# Patient Record
Sex: FEMALE | Race: WHITE | NOT HISPANIC OR LATINO
[De-identification: names, ages, dates, MRNs, and addresses within clinical notes are randomized per-mention and may not be internally consistent; named-entity substitution may affect disease eponyms.]

---

## 2017-01-09 ENCOUNTER — RECORD ABSTRACTING (OUTPATIENT)
Age: 57
End: 2017-01-09

## 2017-01-09 DIAGNOSIS — F41.9 ANXIETY DISORDER, UNSPECIFIED: ICD-10-CM

## 2017-01-09 DIAGNOSIS — F17.200 NICOTINE DEPENDENCE, UNSPECIFIED, UNCOMPLICATED: ICD-10-CM

## 2017-01-09 DIAGNOSIS — F31.9 BIPOLAR DISORDER, UNSPECIFIED: ICD-10-CM

## 2017-01-09 DIAGNOSIS — Z87.01 PERSONAL HISTORY OF PNEUMONIA (RECURRENT): ICD-10-CM

## 2017-01-09 DIAGNOSIS — R56.9 UNSPECIFIED CONVULSIONS: ICD-10-CM

## 2017-01-09 PROBLEM — Z00.00 ENCOUNTER FOR PREVENTIVE HEALTH EXAMINATION: Status: ACTIVE | Noted: 2017-01-09

## 2017-01-09 RX ORDER — GABAPENTIN 300 MG
300 TABLET ORAL
Refills: 0 | Status: ACTIVE | COMMUNITY

## 2017-01-09 RX ORDER — CLONAZEPAM 2 MG/1
2 TABLET ORAL
Refills: 0 | Status: ACTIVE | COMMUNITY

## 2017-01-09 RX ORDER — DIVALPROEX SODIUM 500 MG/1
500 TABLET, DELAYED RELEASE ORAL
Refills: 0 | Status: ACTIVE | COMMUNITY

## 2017-02-24 ENCOUNTER — INPATIENT (INPATIENT)
Facility: HOSPITAL | Age: 57
LOS: 2 days | Discharge: HOME | End: 2017-02-27
Attending: HOSPITALIST

## 2017-03-18 ENCOUNTER — INPATIENT (INPATIENT)
Facility: HOSPITAL | Age: 57
LOS: 8 days | Discharge: HOME | End: 2017-03-27
Attending: INTERNAL MEDICINE

## 2017-06-05 ENCOUNTER — EMERGENCY (EMERGENCY)
Facility: HOSPITAL | Age: 57
LOS: 0 days | Discharge: HOME | End: 2017-06-05

## 2017-06-05 DIAGNOSIS — F31.70 BIPOLAR DISORDER, CURRENTLY IN REMISSION, MOST RECENT EPISODE UNSPECIFIED: ICD-10-CM

## 2017-06-05 DIAGNOSIS — K52.9 NONINFECTIVE GASTROENTERITIS AND COLITIS, UNSPECIFIED: ICD-10-CM

## 2017-06-05 DIAGNOSIS — F13.20 SEDATIVE, HYPNOTIC OR ANXIOLYTIC DEPENDENCE, UNCOMPLICATED: ICD-10-CM

## 2017-06-05 DIAGNOSIS — F39 UNSPECIFIED MOOD [AFFECTIVE] DISORDER: ICD-10-CM

## 2017-06-05 DIAGNOSIS — F33.2 MAJOR DEPRESSIVE DISORDER, RECURRENT SEVERE WITHOUT PSYCHOTIC FEATURES: ICD-10-CM

## 2017-06-05 DIAGNOSIS — M19.90 UNSPECIFIED OSTEOARTHRITIS, UNSPECIFIED SITE: ICD-10-CM

## 2017-06-05 DIAGNOSIS — S06.9X9A UNSPECIFIED INTRACRANIAL INJURY WITH LOSS OF CONSCIOUSNESS OF UNSPECIFIED DURATION, INITIAL ENCOUNTER: ICD-10-CM

## 2017-06-05 DIAGNOSIS — R00.1 BRADYCARDIA, UNSPECIFIED: ICD-10-CM

## 2017-06-05 DIAGNOSIS — E78.5 HYPERLIPIDEMIA, UNSPECIFIED: ICD-10-CM

## 2017-06-05 DIAGNOSIS — R56.9 UNSPECIFIED CONVULSIONS: ICD-10-CM

## 2017-06-05 DIAGNOSIS — K57.30 DIVERTICULOSIS OF LARGE INTESTINE WITHOUT PERFORATION OR ABSCESS WITHOUT BLEEDING: ICD-10-CM

## 2017-06-05 DIAGNOSIS — J44.9 CHRONIC OBSTRUCTIVE PULMONARY DISEASE, UNSPECIFIED: ICD-10-CM

## 2017-06-05 DIAGNOSIS — F93.0 SEPARATION ANXIETY DISORDER OF CHILDHOOD: ICD-10-CM

## 2017-06-05 DIAGNOSIS — F17.200 NICOTINE DEPENDENCE, UNSPECIFIED, UNCOMPLICATED: ICD-10-CM

## 2017-06-05 DIAGNOSIS — K80.20 CALCULUS OF GALLBLADDER WITHOUT CHOLECYSTITIS WITHOUT OBSTRUCTION: ICD-10-CM

## 2017-06-05 DIAGNOSIS — I38 ENDOCARDITIS, VALVE UNSPECIFIED: ICD-10-CM

## 2017-06-05 DIAGNOSIS — G91.9 HYDROCEPHALUS, UNSPECIFIED: ICD-10-CM

## 2017-06-05 DIAGNOSIS — G83.9 PARALYTIC SYNDROME, UNSPECIFIED: ICD-10-CM

## 2017-06-05 DIAGNOSIS — F41.9 ANXIETY DISORDER, UNSPECIFIED: ICD-10-CM

## 2017-06-05 DIAGNOSIS — R32 UNSPECIFIED URINARY INCONTINENCE: ICD-10-CM

## 2017-06-05 DIAGNOSIS — F11.20 OPIOID DEPENDENCE, UNCOMPLICATED: ICD-10-CM

## 2017-06-05 DIAGNOSIS — B18.2 CHRONIC VIRAL HEPATITIS C: ICD-10-CM

## 2017-06-05 DIAGNOSIS — I10 ESSENTIAL (PRIMARY) HYPERTENSION: ICD-10-CM

## 2017-06-05 DIAGNOSIS — J69.0 PNEUMONITIS DUE TO INHALATION OF FOOD AND VOMIT: ICD-10-CM

## 2017-06-05 DIAGNOSIS — S09.90XA UNSPECIFIED INJURY OF HEAD, INITIAL ENCOUNTER: ICD-10-CM

## 2017-06-05 DIAGNOSIS — J96.00 ACUTE RESPIRATORY FAILURE, UNSPECIFIED WHETHER WITH HYPOXIA OR HYPERCAPNIA: ICD-10-CM

## 2017-06-28 DIAGNOSIS — J45.909 UNSPECIFIED ASTHMA, UNCOMPLICATED: ICD-10-CM

## 2017-06-28 DIAGNOSIS — R52 PAIN, UNSPECIFIED: ICD-10-CM

## 2017-06-28 DIAGNOSIS — M54.5 LOW BACK PAIN: ICD-10-CM

## 2017-06-28 DIAGNOSIS — G40.909 EPILEPSY, UNSPECIFIED, NOT INTRACTABLE, WITHOUT STATUS EPILEPTICUS: ICD-10-CM
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
## 2017-06-28 DIAGNOSIS — J44.1 CHRONIC OBSTRUCTIVE PULMONARY DISEASE WITH (ACUTE) EXACERBATION: ICD-10-CM

## 2017-06-28 DIAGNOSIS — T42.4X1A POISONING BY BENZODIAZEPINES, ACCIDENTAL (UNINTENTIONAL), INITIAL ENCOUNTER: ICD-10-CM

## 2017-06-28 DIAGNOSIS — R53.1 WEAKNESS: ICD-10-CM

## 2017-06-28 DIAGNOSIS — Z79.899 OTHER LONG TERM (CURRENT) DRUG THERAPY: ICD-10-CM

## 2017-06-28 DIAGNOSIS — F31.9 BIPOLAR DISORDER, UNSPECIFIED: ICD-10-CM

## 2017-06-28 DIAGNOSIS — M54.2 CERVICALGIA: ICD-10-CM

## 2017-06-28 DIAGNOSIS — J98.11 ATELECTASIS: ICD-10-CM

## 2017-06-28 DIAGNOSIS — F13.229 SEDATIVE, HYPNOTIC OR ANXIOLYTIC DEPENDENCE WITH INTOXICATION, UNSPECIFIED: ICD-10-CM
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
## 2017-06-28 DIAGNOSIS — M54.6 PAIN IN THORACIC SPINE: ICD-10-CM

## 2017-06-28 DIAGNOSIS — J44.0 CHRONIC OBSTRUCTIVE PULMONARY DISEASE WITH ACUTE LOWER RESPIRATORY INFECTION: ICD-10-CM

## 2017-06-28 DIAGNOSIS — J20.9 ACUTE BRONCHITIS, UNSPECIFIED: ICD-10-CM
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
## 2017-06-28 DIAGNOSIS — S50.02XA CONTUSION OF LEFT ELBOW, INITIAL ENCOUNTER: ICD-10-CM

## 2017-06-28 DIAGNOSIS — J96.11 CHRONIC RESPIRATORY FAILURE WITH HYPOXIA: ICD-10-CM

## 2017-06-28 DIAGNOSIS — F17.210 NICOTINE DEPENDENCE, CIGARETTES, UNCOMPLICATED: ICD-10-CM

## 2017-06-28 DIAGNOSIS — F32.9 MAJOR DEPRESSIVE DISORDER, SINGLE EPISODE, UNSPECIFIED: ICD-10-CM

## 2017-06-28 DIAGNOSIS — R26.89 OTHER ABNORMALITIES OF GAIT AND MOBILITY: ICD-10-CM

## 2017-06-28 DIAGNOSIS — F41.9 ANXIETY DISORDER, UNSPECIFIED: ICD-10-CM

## 2017-06-28 DIAGNOSIS — Z91.19 PATIENT'S NONCOMPLIANCE WITH OTHER MEDICAL TREATMENT AND REGIMEN: ICD-10-CM

## 2017-06-28 DIAGNOSIS — M79.622 PAIN IN LEFT UPPER ARM: ICD-10-CM

## 2017-06-28 DIAGNOSIS — S05.11XA CONTUSION OF EYEBALL AND ORBITAL TISSUES, RIGHT EYE, INITIAL ENCOUNTER: ICD-10-CM

## 2017-06-28 DIAGNOSIS — Z91.5 PERSONAL HISTORY OF SELF-HARM: ICD-10-CM

## 2017-06-28 DIAGNOSIS — Z91.81 HISTORY OF FALLING: ICD-10-CM

## 2017-07-03 ENCOUNTER — OUTPATIENT (OUTPATIENT)
Dept: OUTPATIENT SERVICES | Facility: HOSPITAL | Age: 57
LOS: 1 days | Discharge: HOME | End: 2017-07-03

## 2017-07-03 DIAGNOSIS — E78.5 HYPERLIPIDEMIA, UNSPECIFIED: ICD-10-CM

## 2017-07-03 DIAGNOSIS — F31.70 BIPOLAR DISORDER, CURRENTLY IN REMISSION, MOST RECENT EPISODE UNSPECIFIED: ICD-10-CM

## 2017-07-03 DIAGNOSIS — R00.1 BRADYCARDIA, UNSPECIFIED: ICD-10-CM

## 2017-07-03 DIAGNOSIS — K52.9 NONINFECTIVE GASTROENTERITIS AND COLITIS, UNSPECIFIED: ICD-10-CM

## 2017-07-03 DIAGNOSIS — S09.90XA UNSPECIFIED INJURY OF HEAD, INITIAL ENCOUNTER: ICD-10-CM

## 2017-07-03 DIAGNOSIS — K57.30 DIVERTICULOSIS OF LARGE INTESTINE WITHOUT PERFORATION OR ABSCESS WITHOUT BLEEDING: ICD-10-CM

## 2017-07-03 DIAGNOSIS — S06.9X9A UNSPECIFIED INTRACRANIAL INJURY WITH LOSS OF CONSCIOUSNESS OF UNSPECIFIED DURATION, INITIAL ENCOUNTER: ICD-10-CM

## 2017-07-03 DIAGNOSIS — J44.9 CHRONIC OBSTRUCTIVE PULMONARY DISEASE, UNSPECIFIED: ICD-10-CM

## 2017-07-03 DIAGNOSIS — K80.20 CALCULUS OF GALLBLADDER WITHOUT CHOLECYSTITIS WITHOUT OBSTRUCTION: ICD-10-CM

## 2017-07-03 DIAGNOSIS — F39 UNSPECIFIED MOOD [AFFECTIVE] DISORDER: ICD-10-CM

## 2017-07-03 DIAGNOSIS — J69.0 PNEUMONITIS DUE TO INHALATION OF FOOD AND VOMIT: ICD-10-CM

## 2017-07-03 DIAGNOSIS — B18.2 CHRONIC VIRAL HEPATITIS C: ICD-10-CM

## 2017-07-03 DIAGNOSIS — G91.9 HYDROCEPHALUS, UNSPECIFIED: ICD-10-CM

## 2017-07-03 DIAGNOSIS — I10 ESSENTIAL (PRIMARY) HYPERTENSION: ICD-10-CM

## 2017-07-03 DIAGNOSIS — G83.9 PARALYTIC SYNDROME, UNSPECIFIED: ICD-10-CM

## 2017-07-03 DIAGNOSIS — I38 ENDOCARDITIS, VALVE UNSPECIFIED: ICD-10-CM

## 2017-07-03 DIAGNOSIS — J96.00 ACUTE RESPIRATORY FAILURE, UNSPECIFIED WHETHER WITH HYPOXIA OR HYPERCAPNIA: ICD-10-CM

## 2017-07-03 DIAGNOSIS — F33.2 MAJOR DEPRESSIVE DISORDER, RECURRENT SEVERE WITHOUT PSYCHOTIC FEATURES: ICD-10-CM

## 2017-07-03 DIAGNOSIS — F11.20 OPIOID DEPENDENCE, UNCOMPLICATED: ICD-10-CM

## 2017-07-03 DIAGNOSIS — F41.9 ANXIETY DISORDER, UNSPECIFIED: ICD-10-CM

## 2017-07-03 DIAGNOSIS — M19.90 UNSPECIFIED OSTEOARTHRITIS, UNSPECIFIED SITE: ICD-10-CM

## 2017-07-03 DIAGNOSIS — F13.20 SEDATIVE, HYPNOTIC OR ANXIOLYTIC DEPENDENCE, UNCOMPLICATED: ICD-10-CM

## 2017-07-03 DIAGNOSIS — R56.9 UNSPECIFIED CONVULSIONS: ICD-10-CM

## 2017-07-03 DIAGNOSIS — R32 UNSPECIFIED URINARY INCONTINENCE: ICD-10-CM

## 2017-07-03 DIAGNOSIS — F17.200 NICOTINE DEPENDENCE, UNSPECIFIED, UNCOMPLICATED: ICD-10-CM

## 2017-07-03 DIAGNOSIS — F93.0 SEPARATION ANXIETY DISORDER OF CHILDHOOD: ICD-10-CM

## 2018-02-02 DIAGNOSIS — F32.9 MAJOR DEPRESSIVE DISORDER, SINGLE EPISODE, UNSPECIFIED: ICD-10-CM

## 2018-02-02 DIAGNOSIS — G89.4 CHRONIC PAIN SYNDROME: ICD-10-CM

## 2018-02-02 DIAGNOSIS — R29.6 REPEATED FALLS: ICD-10-CM

## 2018-02-02 DIAGNOSIS — F13.20 SEDATIVE, HYPNOTIC OR ANXIOLYTIC DEPENDENCE, UNCOMPLICATED: ICD-10-CM

## 2018-02-02 DIAGNOSIS — F17.210 NICOTINE DEPENDENCE, CIGARETTES, UNCOMPLICATED: ICD-10-CM

## 2018-02-02 DIAGNOSIS — F41.9 ANXIETY DISORDER, UNSPECIFIED: ICD-10-CM

## 2018-02-02 DIAGNOSIS — G40.909 EPILEPSY, UNSPECIFIED, NOT INTRACTABLE, WITHOUT STATUS EPILEPTICUS: ICD-10-CM

## 2018-02-02 DIAGNOSIS — F11.20 OPIOID DEPENDENCE, UNCOMPLICATED: ICD-10-CM

## 2019-03-05 ENCOUNTER — INPATIENT (INPATIENT)
Facility: HOSPITAL | Age: 59
LOS: 3 days | Discharge: HOME | End: 2019-03-09
Attending: INTERNAL MEDICINE | Admitting: INTERNAL MEDICINE
Payer: MEDICARE

## 2019-03-05 VITALS
OXYGEN SATURATION: 90 % | HEART RATE: 111 BPM | RESPIRATION RATE: 20 BRPM | SYSTOLIC BLOOD PRESSURE: 144 MMHG | DIASTOLIC BLOOD PRESSURE: 70 MMHG | TEMPERATURE: 99 F

## 2019-03-05 RX ORDER — SODIUM CHLORIDE 9 MG/ML
2000 INJECTION INTRAMUSCULAR; INTRAVENOUS; SUBCUTANEOUS ONCE
Qty: 0 | Refills: 0 | Status: COMPLETED | OUTPATIENT
Start: 2019-03-05 | End: 2019-03-05

## 2019-03-05 NOTE — ED ADULT TRIAGE NOTE - CHIEF COMPLAINT QUOTE
[place] [facility] [place] has hx of pneumonia w/ intubation.

## 2019-03-06 LAB
ALBUMIN SERPL ELPH-MCNC: 4 G/DL — SIGNIFICANT CHANGE UP (ref 3.5–5.2)
ALP SERPL-CCNC: 149 U/L — HIGH (ref 30–115)
ALT FLD-CCNC: 42 U/L — HIGH (ref 0–41)
ANION GAP SERPL CALC-SCNC: 20 MMOL/L — HIGH (ref 7–14)
APTT BLD: 30.4 SEC — SIGNIFICANT CHANGE UP (ref 27–39.2)
AST SERPL-CCNC: 55 U/L — HIGH (ref 0–41)
BASE EXCESS BLDV CALC-SCNC: 0.3 MMOL/L — SIGNIFICANT CHANGE UP (ref -2–2)
BASOPHILS # BLD AUTO: 0.01 K/UL — SIGNIFICANT CHANGE UP (ref 0–0.2)
BASOPHILS NFR BLD AUTO: 0.1 % — SIGNIFICANT CHANGE UP (ref 0–1)
BILIRUB SERPL-MCNC: 0.7 MG/DL — SIGNIFICANT CHANGE UP (ref 0.2–1.2)
BUN SERPL-MCNC: 12 MG/DL — SIGNIFICANT CHANGE UP (ref 10–20)
CA-I SERPL-SCNC: 1.29 MMOL/L — SIGNIFICANT CHANGE UP (ref 1.12–1.3)
CALCIUM SERPL-MCNC: 9.5 MG/DL — SIGNIFICANT CHANGE UP (ref 8.5–10.1)
CHLORIDE SERPL-SCNC: 98 MMOL/L — SIGNIFICANT CHANGE UP (ref 98–110)
CO2 SERPL-SCNC: 20 MMOL/L — SIGNIFICANT CHANGE UP (ref 17–32)
CREAT SERPL-MCNC: 0.8 MG/DL — SIGNIFICANT CHANGE UP (ref 0.7–1.5)
EOSINOPHIL # BLD AUTO: 0.01 K/UL — SIGNIFICANT CHANGE UP (ref 0–0.7)
EOSINOPHIL NFR BLD AUTO: 0.1 % — SIGNIFICANT CHANGE UP (ref 0–8)
GAS PNL BLDV: 140 MMOL/L — SIGNIFICANT CHANGE UP (ref 136–145)
GAS PNL BLDV: SIGNIFICANT CHANGE UP
GLUCOSE SERPL-MCNC: 110 MG/DL — HIGH (ref 70–99)
HCO3 BLDV-SCNC: 29 MMOL/L — SIGNIFICANT CHANGE UP (ref 22–29)
HCT VFR BLD CALC: 41 % — SIGNIFICANT CHANGE UP (ref 37–47)
HCT VFR BLDA CALC: 44.4 % — HIGH (ref 34–44)
HGB BLD CALC-MCNC: 14.5 G/DL — SIGNIFICANT CHANGE UP (ref 14–18)
HGB BLD-MCNC: 14.1 G/DL — SIGNIFICANT CHANGE UP (ref 12–16)
IMM GRANULOCYTES NFR BLD AUTO: 0.4 % — HIGH (ref 0.1–0.3)
INR BLD: 1.03 RATIO — SIGNIFICANT CHANGE UP (ref 0.65–1.3)
LACTATE BLDV-MCNC: 0.7 MMOL/L — SIGNIFICANT CHANGE UP (ref 0.5–1.6)
LYMPHOCYTES # BLD AUTO: 1.92 K/UL — SIGNIFICANT CHANGE UP (ref 1.2–3.4)
LYMPHOCYTES # BLD AUTO: 21.6 % — SIGNIFICANT CHANGE UP (ref 20.5–51.1)
MCHC RBC-ENTMCNC: 32.5 PG — HIGH (ref 27–31)
MCHC RBC-ENTMCNC: 34.4 G/DL — SIGNIFICANT CHANGE UP (ref 32–37)
MCV RBC AUTO: 94.5 FL — SIGNIFICANT CHANGE UP (ref 81–99)
MONOCYTES # BLD AUTO: 0.97 K/UL — HIGH (ref 0.1–0.6)
MONOCYTES NFR BLD AUTO: 10.9 % — HIGH (ref 1.7–9.3)
NEUTROPHILS # BLD AUTO: 5.94 K/UL — SIGNIFICANT CHANGE UP (ref 1.4–6.5)
NEUTROPHILS NFR BLD AUTO: 66.9 % — SIGNIFICANT CHANGE UP (ref 42.2–75.2)
NRBC # BLD: 0 /100 WBCS — SIGNIFICANT CHANGE UP (ref 0–0)
PCO2 BLDV: 62 MMHG — HIGH (ref 41–51)
PH BLDV: 7.28 — SIGNIFICANT CHANGE UP (ref 7.26–7.43)
PLATELET # BLD AUTO: 144 K/UL — SIGNIFICANT CHANGE UP (ref 130–400)
PO2 BLDV: 23 MMHG — SIGNIFICANT CHANGE UP (ref 20–40)
POTASSIUM BLDV-SCNC: 3.5 MMOL/L — SIGNIFICANT CHANGE UP (ref 3.3–5.6)
POTASSIUM SERPL-MCNC: 3.8 MMOL/L — SIGNIFICANT CHANGE UP (ref 3.5–5)
POTASSIUM SERPL-SCNC: 3.8 MMOL/L — SIGNIFICANT CHANGE UP (ref 3.5–5)
PROT SERPL-MCNC: 6.9 G/DL — SIGNIFICANT CHANGE UP (ref 6–8)
PROTHROM AB SERPL-ACNC: 11.8 SEC — SIGNIFICANT CHANGE UP (ref 9.95–12.87)
RBC # BLD: 4.34 M/UL — SIGNIFICANT CHANGE UP (ref 4.2–5.4)
RBC # FLD: 13.8 % — SIGNIFICANT CHANGE UP (ref 11.5–14.5)
SAO2 % BLDV: 44 % — SIGNIFICANT CHANGE UP
SODIUM SERPL-SCNC: 138 MMOL/L — SIGNIFICANT CHANGE UP (ref 135–146)
WBC # BLD: 8.89 K/UL — SIGNIFICANT CHANGE UP (ref 4.8–10.8)
WBC # FLD AUTO: 8.89 K/UL — SIGNIFICANT CHANGE UP (ref 4.8–10.8)

## 2019-03-06 RX ORDER — ESCITALOPRAM OXALATE 10 MG/1
10 TABLET, FILM COATED ORAL DAILY
Qty: 0 | Refills: 0 | Status: DISCONTINUED | OUTPATIENT
Start: 2019-03-06 | End: 2019-03-09

## 2019-03-06 RX ORDER — CHLORHEXIDINE GLUCONATE 213 G/1000ML
1 SOLUTION TOPICAL
Qty: 0 | Refills: 0 | Status: DISCONTINUED | OUTPATIENT
Start: 2019-03-06 | End: 2019-03-09

## 2019-03-06 RX ORDER — ERGOCALCIFEROL 1.25 MG/1
50000 CAPSULE ORAL
Qty: 0 | Refills: 0 | Status: DISCONTINUED | OUTPATIENT
Start: 2019-03-06 | End: 2019-03-09

## 2019-03-06 RX ORDER — IPRATROPIUM/ALBUTEROL SULFATE 18-103MCG
3 AEROSOL WITH ADAPTER (GRAM) INHALATION EVERY 6 HOURS
Qty: 0 | Refills: 0 | Status: DISCONTINUED | OUTPATIENT
Start: 2019-03-06 | End: 2019-03-09

## 2019-03-06 RX ORDER — CLONAZEPAM 1 MG
2 TABLET ORAL
Qty: 0 | Refills: 0 | Status: DISCONTINUED | OUTPATIENT
Start: 2019-03-06 | End: 2019-03-09

## 2019-03-06 RX ORDER — IPRATROPIUM/ALBUTEROL SULFATE 18-103MCG
3 AEROSOL WITH ADAPTER (GRAM) INHALATION ONCE
Qty: 0 | Refills: 0 | Status: COMPLETED | OUTPATIENT
Start: 2019-03-06 | End: 2019-03-06

## 2019-03-06 RX ORDER — BUDESONIDE AND FORMOTEROL FUMARATE DIHYDRATE 160; 4.5 UG/1; UG/1
2 AEROSOL RESPIRATORY (INHALATION)
Qty: 0 | Refills: 0 | Status: DISCONTINUED | OUTPATIENT
Start: 2019-03-06 | End: 2019-03-09

## 2019-03-06 RX ORDER — AZITHROMYCIN 500 MG/1
500 TABLET, FILM COATED ORAL DAILY
Qty: 0 | Refills: 0 | Status: DISCONTINUED | OUTPATIENT
Start: 2019-03-07 | End: 2019-03-09

## 2019-03-06 RX ORDER — ZOLPIDEM TARTRATE 10 MG/1
5 TABLET ORAL ONCE
Qty: 0 | Refills: 0 | Status: DISCONTINUED | OUTPATIENT
Start: 2019-03-06 | End: 2019-03-06

## 2019-03-06 RX ORDER — GABAPENTIN 400 MG/1
800 CAPSULE ORAL THREE TIMES A DAY
Qty: 0 | Refills: 0 | Status: DISCONTINUED | OUTPATIENT
Start: 2019-03-06 | End: 2019-03-09

## 2019-03-06 RX ORDER — ENOXAPARIN SODIUM 100 MG/ML
40 INJECTION SUBCUTANEOUS DAILY
Qty: 0 | Refills: 0 | Status: DISCONTINUED | OUTPATIENT
Start: 2019-03-06 | End: 2019-03-09

## 2019-03-06 RX ADMIN — SODIUM CHLORIDE 1000 MILLILITER(S): 9 INJECTION INTRAMUSCULAR; INTRAVENOUS; SUBCUTANEOUS at 01:13

## 2019-03-06 RX ADMIN — Medication 3 MILLILITER(S): at 02:20

## 2019-03-06 RX ADMIN — Medication 60 MILLIGRAM(S): at 11:47

## 2019-03-06 RX ADMIN — Medication 2 MILLIGRAM(S): at 06:04

## 2019-03-06 RX ADMIN — GABAPENTIN 800 MILLIGRAM(S): 400 CAPSULE ORAL at 05:31

## 2019-03-06 RX ADMIN — Medication 3 MILLILITER(S): at 21:58

## 2019-03-06 RX ADMIN — ERGOCALCIFEROL 50000 UNIT(S): 1.25 CAPSULE ORAL at 11:42

## 2019-03-06 RX ADMIN — BUDESONIDE AND FORMOTEROL FUMARATE DIHYDRATE 2 PUFF(S): 160; 4.5 AEROSOL RESPIRATORY (INHALATION) at 21:59

## 2019-03-06 RX ADMIN — GABAPENTIN 800 MILLIGRAM(S): 400 CAPSULE ORAL at 14:39

## 2019-03-06 RX ADMIN — Medication 3 MILLILITER(S): at 01:48

## 2019-03-06 RX ADMIN — Medication 3 MILLILITER(S): at 11:47

## 2019-03-06 RX ADMIN — Medication 2 MILLIGRAM(S): at 18:04

## 2019-03-06 RX ADMIN — Medication 125 MILLIGRAM(S): at 01:00

## 2019-03-06 RX ADMIN — ZOLPIDEM TARTRATE 5 MILLIGRAM(S): 10 TABLET ORAL at 23:17

## 2019-03-06 RX ADMIN — ESCITALOPRAM OXALATE 10 MILLIGRAM(S): 10 TABLET, FILM COATED ORAL at 11:41

## 2019-03-06 RX ADMIN — GABAPENTIN 800 MILLIGRAM(S): 400 CAPSULE ORAL at 21:58

## 2019-03-06 RX ADMIN — Medication 3 MILLILITER(S): at 02:00

## 2019-03-06 NOTE — ED PROVIDER NOTE - OBJECTIVE STATEMENT
59 y/o F with PMH hepC, bipolar d/o, PNA requiring intubation in past (most recent 6 years ago), COPD not on home O2, but told that she needs it, presents with cough/SOB/Congestion x wks, worse today. in triage desaturated to low 80s, but improved with 4LPM O2 via NC. Denies CP, palpitations, back pain, abdominal pain, n/v/d, fevers, sweats, chills, HA, sore throat/difficulty swallowing, trauma, fall, recent travel, sick contacts, leg pain/swelling, urinary symptoms, rash. Denies hemoptysis, recent surgery/immobilization, hx cancers, hx PE/DVT,  hormone use.

## 2019-03-06 NOTE — H&P ADULT - NSHPPHYSICALEXAM_GEN_ALL_CORE
GENERAL: NAD, well-groomed, well-developed  HEAD:  NCAT  EYES: EOMI, PERRL, conjunctiva clear  ENMT: No tonsillar erythema, exudates, or enlargement; Moist mucous membranes, Good dentition, No lesions  NECK: Supple, No JVD  NERVOUS SYSTEM: AAOX3  CHEST/LUNG: decreased bs b/l  HEART: +s1s2 RRR no m/g/r  ABDOMEN: soft, NT/ND (+) bs, no HSM  EXTREMITIES:  2+ Peripheral Pulses, No c/c/e  LYMPH: No lymphadenopathy noted  SKIN: No rashes or lesions GENERAL: NAD, well-groomed, well-developed  HEAD:  NCAT  EYES: EOMI, PERRL, conjunctiva clear  ENMT: No tonsillar erythema, exudates, or enlargement; Moist mucous membranes, Good dentition, No lesions  NECK: Supple, No JVD  NERVOUS SYSTEM: AAOX3  CHEST/LUNG: decreased bs b/l, expiratory wheezing, crackles  HEART: +s1s2 RRR no m/g/r  ABDOMEN: soft, NT/ND (+) bs, no HSM  EXTREMITIES:  2+ Peripheral Pulses, No c/c/e  LYMPH: No lymphadenopathy noted  SKIN: No rashes or lesions

## 2019-03-06 NOTE — H&P ADULT - HISTORY OF PRESENT ILLNESS
58 year lady with PMHx COPD not currently on O2, Bipolar disorder, Hep C presenting with cough, SOB, congestion x a few weeks. Admits to fever, chills, bloody tinged diarrhea for the past few days. She states that she has been not herself lately as she has been getting lost in her neighborhood and forgetting. She knocked on her landlords door at 5 AM thinking it was 5pm. She admits to letting herself go as she is very afraid of hospitals. Denied HA, blurry vision, cp, palpitations, syncope, dizziness, LE swelling, wheezing, hemoptysis, abdominal pain, n/v/c, numbness, tingling, weakness, rash, urinary incontinance, dysuria, hematuria, melena. Of note, she states that 6 years ago she was on home oxygen after she was on a ventilator but thereafter did not need it anymore.    In ED, bp  144/70    RR  20   90% on 4LNC  T98.8F. She desaturated to low 80s but improved with 4L NC. CXR showed RLL PNA.

## 2019-03-06 NOTE — ED PROVIDER NOTE - PHYSICAL EXAMINATION
PHYSICAL EXAM:    GENERAL: Alert, appears stated age, well appearing, non-toxic. speaking in complete sentences.   SKIN: Warm, pink and dry. MMM.   EYE: Normal lids/conjunctiva  ENT: Normal hearing, patent oropharynx without erythema or exudate  NECK: +supple. No meningismus, or JVD  Pulm: Bilateral BS, normal resp effort, no stridor, or retractions. +minimal wheeze. +RLL crackles.   CV: RRR, no M/R/G, 2+and = radial pulses  Abd: soft, non-tender, non-distended  Mskel: no erythema, cyanosis, edema. no calf tenderness  Neuro: AAOx3, 5/5 strength throughout. normal gait.

## 2019-03-06 NOTE — CONSULT NOTE ADULT - SUBJECTIVE AND OBJECTIVE BOX
ANASTACIO SIMENTAL  58y, Female  Allergy: ampicillin (hives, swelling)  Lithium Carbonate (Unknown)      HPI:  58 year lady with PMHx COPD not currently on O2, Bipolar disorder, Hep C (15years ago, IV drug abuse) presenting with cough, SOB, congestion for 4 days with a Tmax of 102F. Pt also reported bloody tinged diarrhea (muddy diarrhea) for the past few months. Pt reports her last colonscopy was 3 years ago where they found and removed multiple polyps as per pt. She states that she has been not herself lately as she has been getting lost in her neighborhood and forgetting. She knocked on her landlords door at 5 AM thinking it was 5pm. She admits to letting herself go as she is very afraid of hospitals. Pt is emotional and keeps stating that she can not be on the ventilator again. She reports she was last  hospitalized 6 years ago for similar presentation and was on a ventilator. She failed to come off the ventilator 3 times and upon discharge was put on home O2. She reports she has not been on Home o2 but did not specify time period. Denied HA, blurry vision, cp, palpitations, syncope, dizziness, LE swelling, hemoptysis, weakness, rash, urinary incontinance, dysuria, hematuria. She denies any sick contacts.     In ED, bp  144/70    RR  20   90% on 4LNC  T98.8F. She desaturated to low 80s but improved with 4L NC. CXR showed RLL PNA. 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  Smokes < 1/2 pack per day for 15years.  HIV tested negative 3 years ago.   Not sexually active, last 8 years ago.   Occasional alcohol use     ROS negative except as per HPI    VITALS:  T(F): 98, Max: 98.8 (03-05-19 @ 23:18)  HR: 79  BP: 114/78  RR: 18Vital Signs Last 24 Hrs  T(C): 36.7 (06 Mar 2019 05:32), Max: 37.1 (05 Mar 2019 23:18)  T(F): 98 (06 Mar 2019 05:32), Max: 98.8 (05 Mar 2019 23:18)  HR: 79 (06 Mar 2019 05:32) (79 - 111)  BP: 114/78 (06 Mar 2019 05:32) (114/78 - 144/70)  BP(mean): --  RR: 18 (06 Mar 2019 05:32) (18 - 20)  SpO2: 96% (06 Mar 2019 05:32) (90% - 96%)    PHYSICAL EXAM:  General:  answers in full sentences, minor resp distress  HEENT: normocephalic, atruamatic, conjuctiva clear.  Lungs: diffuse b/l expiratory wheezing, decreased BS  Extremities: no edema in b/l extremities.     TESTS & MEASUREMENTS:                        14.1   8.89  )-----------( 144      ( 05 Mar 2019 23:47 )             41.0     03-05    138  |  98  |  12  ----------------------------<  110<H>  3.8   |  20  |  0.8    Ca    9.5      05 Mar 2019 23:47    TPro  6.9  /  Alb  4.0  /  TBili  0.7  /  DBili  x   /  AST  55<H>  /  ALT  42<H>  /  AlkPhos  149<H>  03-05    LIVER FUNCTIONS - ( 05 Mar 2019 23:47 )  Alb: 4.0 g/dL / Pro: 6.9 g/dL / ALK PHOS: 149 U/L / ALT: 42 U/L / AST: 55 U/L / GGT: x             RADIOLOGY & ADDITIONAL TESTS:  < from: Xray Chest 2 Views PA/Lat (03.06.19 @ 01:05) >  Right middle lobe opacity.    < end of copied text >      ANTIBIOTICS:  levoFLOXacin IVPB   50 mL/Hr IV Intermittent (03-06-19 @ 01:14)    levoFLOXacin IVPB   100 mL/Hr IV Intermittent (03-06-19 @ 06:04)

## 2019-03-06 NOTE — H&P ADULT - ATTENDING COMMENTS
Patient seen and examined at bedside. Agree with above. IV antibiotics as ordered. Sputum/blood cultures and sensitivity. O2/bronchodilators as ordered. Pulmonary consult/ID consult. Prognosis guarded Patient seen and examined at bedside. Agree with above. RLL pneumonia/COPD exacerbation. IV antibiotics as ordered. IV steroids per Pulmonary. Sputum/blood cultures and sensitivity. O2/bronchodilators as ordered. Pulmonary consult/ID consult. Prognosis guarded

## 2019-03-06 NOTE — H&P ADULT - NSHPLABSRESULTS_GEN_ALL_CORE
Labs:                        14.1   8.89  )-----------( 144      ( 05 Mar 2019 23:47 )             41.0                                   03-05    138  |  98  |  12  ----------------------------<  110<H>  3.8   |  20  |  0.8    Ca    9.5      05 Mar 2019 23:47    TPro  6.9  /  Alb  4.0  /  TBili  0.7  /  DBili  x   /  AST  55<H>  /  ALT  42<H>  /  AlkPhos  149<H>  03-05    LIVER FUNCTIONS - ( 05 Mar 2019 23:47 )  Alb: 4.0 g/dL / Pro: 6.9 g/dL / ALK PHOS: 149 U/L / ALT: 42 U/L / AST: 55 U/L / GGT: x         PT/INR - ( 05 Mar 2019 23:47 )   PT: 11.80 sec;   INR: 1.03 ratio      PTT - ( 05 Mar 2019 23:47 )  PTT:30.4 sec                         Imaging:  CXR: RLL opacity

## 2019-03-06 NOTE — CONSULT NOTE ADULT - ASSESSMENT
58 year lady with PMHx COPD not currently on O2, Bipolar disorder, Hep C (15years ago, IV drug abuse) presenting with cough, SOB, congestion for 4 days with a Tmax of 102F.    #Pneumonia  - f/u blood cultures  - f/u HepC Ab titers  - f/u flu A, B, RSV swab  - D/c levofloxacin, start moxifloxacin 400mg IV for 5 days.   - c/w nebulizer treatments 58 year lady with PMHx COPD not currently on O2, Bipolar disorder, Hep C (15years ago, IV drug abuse) presenting with cough, SOB, congestion for 4 days with a Tmax of 102F.    #Pneumonia/COPD exacerbation  - f/u blood cultures  - f/u Hep C Ab titers  - f/u flu A, B, RSV swab  - D/c levofloxacin, start azithromycin  - c/w nebulizer treatments 58 year lady with PMHx COPD not currently on O2, Bipolar disorder, Hep C (15years ago, IV drug abuse) presenting with cough, SOB, congestion for 4 days with a Tmax of 102F.  Sepsis ruled out on admission    #Pneumonia/COPD exacerbation  - f/u blood cultures  - f/u flu A, B, RSV swab  - D/c levofloxacin, start azithromycin 500mg then 250 q24h x 5 days  - c/w nebulizer treatments  - trend LFTS

## 2019-03-06 NOTE — ED PROVIDER NOTE - NS ED ROS FT
Review of Systems    Constitutional: (-) fever  Eyes/ENT: (-) blurry vision  Cardiovascular: (-) chest pain, (-) syncope  Respiratory: (+) cough, (+) shortness of breath  Gastrointestinal: (-) vomiting, (-) diarrhea  Genitourinary:  (-) dysuria   Musculoskeletal: (-) neck pain, (-) back pain  Integumentary: (-) rash, (-) edema  Neurological: (-) headache  Hematologic: (-) easy bruising

## 2019-03-06 NOTE — ED ADULT NURSE REASSESSMENT NOTE - NS ED NURSE REASSESS COMMENT FT1
pt awake and alert, upset about being in hospital. Pt demanding to leave ER and wants to go to cafeteria. Explained to patient we can provide her with food and juice, pt upset yelling at staff wanting to go . Pt ambulating self steadily has with cane.

## 2019-03-06 NOTE — ED PROVIDER NOTE - CLINICAL SUMMARY MEDICAL DECISION MAKING FREE TEXT BOX
58 female here for evaluation of SOB with cough, found to be hypoxic. Has home Rx but no home oxygen. Got screening labs imaging and reevaluation, plan is for admission for pneumonia. IV ABX provided.

## 2019-03-06 NOTE — H&P ADULT - ASSESSMENT
58 year lady with PMHx COPD not currently on O2, Bipolar disorder, Hep C presenting with cough, SOB, congestion x a few weeks.     Cough, congestion, SOB 2/2 COPD exacerbation due RLL PNA. Possible gram(-) PNA.  -admit to medicine  -c/w levaquin  -f/u cultures  -consider ID consult  -consider pulm eval    Forgetfulness, disorientation  -consider neuro eval  -f/u B12, folate  -f/u CTHNC as she has reported many "tumbles".    Bipolar   -stable  -c/w klonopin    Hep C  -stable    Dispo  -from home  -ambulates independtly with walker  -anticipated discharge when medically stable  -f/u PT/rehab    DVT ppx  GI ppx  CHG 4% daily and PRN  OOBTC  DASH/TLC   FULL CODE

## 2019-03-06 NOTE — ED PROVIDER NOTE - ATTENDING CONTRIBUTION TO CARE
I personally evaluated the patient. I reviewed the Resident’s or Physician Assistant’s note (as assigned above), and agree with the findings and plan except as documented in my note.     58 female smoker here for cough chills and productive sputum. No home oxygen. No rescue bronchodilators. Prior admission for pneumonia for hypoxia. Brought by ED triage to CC/T for hypoxemia on vitals. PMD is Dr. Mosquera, Dr. Iyer    PE: female in no distress. CHEST: bilateral ronchi. mildly tachypneic. speech intact with limited speech. CV: pulses intact. no tachycardia. ABD: soft, non rigid. SKIN: normal.     Impression: pneumonia, hypoxemia    Plan: IV labs imaging supportive care and admission for supplemental oxygen

## 2019-03-06 NOTE — H&P ADULT - NSHPREVIEWOFSYSTEMS_GEN_ALL_CORE
CONSTITUTIONAL: No fever, weight loss, or fatigue  EYES: No eye pain, visual disturbances, or discharge  ENMT:  No difficulty hearing, tinnitus, vertigo; No sinus or throat pain  NECK: No pain or stiffness  RESPIRATORY: (+) cough, wheezing, chills. No hemoptysis. (+) shortness of breath  CARDIOVASCULAR: No chest pain, palpitations, dizziness, or leg swelling  GASTROINTESTINAL: No abdominal or epigastric pain. No nausea, vomiting, or hematemesis; (+) diarrhea. No constipation. No melena or hematochezia.  GENITOURINARY: No dysuria, frequency, hematuria, or incontinence  NEUROLOGICAL: No headaches, memory loss, loss of strength, numbness, or tremors  SKIN: No itching, burning, rashes, or lesions   LYMPH NODES: No enlarged glands  ENDOCRINE: No heat or cold intolerance; No hair loss  MUSCULOSKELETAL: No joint pain or swelling; No muscle, back, or extremity pain  PSYCHIATRIC: No depression, anxiety, mood swings, or difficulty sleeping  HEME/LYMPH: No easy bruising, or bleeding gums  ALLERY AND IMMUNOLOGIC: No hives or eczema

## 2019-03-07 LAB
ANION GAP SERPL CALC-SCNC: 12 MMOL/L — SIGNIFICANT CHANGE UP (ref 7–14)
BASOPHILS # BLD AUTO: 0 K/UL — SIGNIFICANT CHANGE UP (ref 0–0.2)
BASOPHILS NFR BLD AUTO: 0 % — SIGNIFICANT CHANGE UP (ref 0–1)
BUN SERPL-MCNC: 19 MG/DL — SIGNIFICANT CHANGE UP (ref 10–20)
CALCIUM SERPL-MCNC: 9.6 MG/DL — SIGNIFICANT CHANGE UP (ref 8.5–10.1)
CHLORIDE SERPL-SCNC: 106 MMOL/L — SIGNIFICANT CHANGE UP (ref 98–110)
CO2 SERPL-SCNC: 21 MMOL/L — SIGNIFICANT CHANGE UP (ref 17–32)
CREAT SERPL-MCNC: 0.6 MG/DL — LOW (ref 0.7–1.5)
EOSINOPHIL # BLD AUTO: 0 K/UL — SIGNIFICANT CHANGE UP (ref 0–0.7)
EOSINOPHIL NFR BLD AUTO: 0 % — SIGNIFICANT CHANGE UP (ref 0–8)
FOLATE SERPL-MCNC: 11.4 NG/ML — SIGNIFICANT CHANGE UP
GLUCOSE SERPL-MCNC: 119 MG/DL — HIGH (ref 70–99)
HCT VFR BLD CALC: 36.9 % — LOW (ref 37–47)
HGB BLD-MCNC: 12.3 G/DL — SIGNIFICANT CHANGE UP (ref 12–16)
IMM GRANULOCYTES NFR BLD AUTO: 0.4 % — HIGH (ref 0.1–0.3)
LYMPHOCYTES # BLD AUTO: 1.61 K/UL — SIGNIFICANT CHANGE UP (ref 1.2–3.4)
LYMPHOCYTES # BLD AUTO: 21.5 % — SIGNIFICANT CHANGE UP (ref 20.5–51.1)
MAGNESIUM SERPL-MCNC: 1.8 MG/DL — SIGNIFICANT CHANGE UP (ref 1.8–2.4)
MCHC RBC-ENTMCNC: 32.4 PG — HIGH (ref 27–31)
MCHC RBC-ENTMCNC: 33.3 G/DL — SIGNIFICANT CHANGE UP (ref 32–37)
MCV RBC AUTO: 97.1 FL — SIGNIFICANT CHANGE UP (ref 81–99)
MONOCYTES # BLD AUTO: 0.75 K/UL — HIGH (ref 0.1–0.6)
MONOCYTES NFR BLD AUTO: 10 % — HIGH (ref 1.7–9.3)
NEUTROPHILS # BLD AUTO: 5.11 K/UL — SIGNIFICANT CHANGE UP (ref 1.4–6.5)
NEUTROPHILS NFR BLD AUTO: 68.1 % — SIGNIFICANT CHANGE UP (ref 42.2–75.2)
NRBC # BLD: 0 /100 WBCS — SIGNIFICANT CHANGE UP (ref 0–0)
PLATELET # BLD AUTO: 133 K/UL — SIGNIFICANT CHANGE UP (ref 130–400)
POTASSIUM SERPL-MCNC: 4.4 MMOL/L — SIGNIFICANT CHANGE UP (ref 3.5–5)
POTASSIUM SERPL-SCNC: 4.4 MMOL/L — SIGNIFICANT CHANGE UP (ref 3.5–5)
RBC # BLD: 3.8 M/UL — LOW (ref 4.2–5.4)
RBC # FLD: 14.1 % — SIGNIFICANT CHANGE UP (ref 11.5–14.5)
SODIUM SERPL-SCNC: 139 MMOL/L — SIGNIFICANT CHANGE UP (ref 135–146)
T3 SERPL-MCNC: 88 NG/DL — SIGNIFICANT CHANGE UP (ref 80–200)
T4 AB SER-ACNC: 9.9 UG/DL — SIGNIFICANT CHANGE UP (ref 4.6–12)
TSH SERPL-MCNC: 0.17 UIU/ML — LOW (ref 0.27–4.2)
VIT B12 SERPL-MCNC: 1123 PG/ML — SIGNIFICANT CHANGE UP (ref 232–1245)
WBC # BLD: 7.5 K/UL — SIGNIFICANT CHANGE UP (ref 4.8–10.8)
WBC # FLD AUTO: 7.5 K/UL — SIGNIFICANT CHANGE UP (ref 4.8–10.8)

## 2019-03-07 PROCEDURE — 99222 1ST HOSP IP/OBS MODERATE 55: CPT

## 2019-03-07 RX ORDER — ALPRAZOLAM 0.25 MG
0.5 TABLET ORAL ONCE
Qty: 0 | Refills: 0 | Status: DISCONTINUED | OUTPATIENT
Start: 2019-03-07 | End: 2019-03-07

## 2019-03-07 RX ADMIN — Medication 0.5 MILLIGRAM(S): at 22:24

## 2019-03-07 RX ADMIN — AZITHROMYCIN 500 MILLIGRAM(S): 500 TABLET, FILM COATED ORAL at 14:06

## 2019-03-07 RX ADMIN — GABAPENTIN 800 MILLIGRAM(S): 400 CAPSULE ORAL at 14:05

## 2019-03-07 RX ADMIN — Medication 2 MILLIGRAM(S): at 05:24

## 2019-03-07 RX ADMIN — GABAPENTIN 800 MILLIGRAM(S): 400 CAPSULE ORAL at 22:24

## 2019-03-07 RX ADMIN — BUDESONIDE AND FORMOTEROL FUMARATE DIHYDRATE 2 PUFF(S): 160; 4.5 AEROSOL RESPIRATORY (INHALATION) at 20:19

## 2019-03-07 RX ADMIN — Medication 3 MILLILITER(S): at 02:11

## 2019-03-07 RX ADMIN — BUDESONIDE AND FORMOTEROL FUMARATE DIHYDRATE 2 PUFF(S): 160; 4.5 AEROSOL RESPIRATORY (INHALATION) at 07:59

## 2019-03-07 RX ADMIN — ESCITALOPRAM OXALATE 10 MILLIGRAM(S): 10 TABLET, FILM COATED ORAL at 14:05

## 2019-03-07 RX ADMIN — Medication 60 MILLIGRAM(S): at 05:24

## 2019-03-07 RX ADMIN — Medication 600 MILLIGRAM(S): at 18:08

## 2019-03-07 RX ADMIN — Medication 3 MILLILITER(S): at 07:59

## 2019-03-07 RX ADMIN — Medication 3 MILLILITER(S): at 14:06

## 2019-03-07 RX ADMIN — Medication 2 MILLIGRAM(S): at 18:08

## 2019-03-07 RX ADMIN — GABAPENTIN 800 MILLIGRAM(S): 400 CAPSULE ORAL at 05:24

## 2019-03-07 NOTE — PROGRESS NOTE ADULT - ASSESSMENT
1. Community acquired pneumonia: Clinically improving. IV antibiotics per ID  2. COPD exacerbation: O2/bronchodilators/IV steroids per Pulmonary  3. Dyspnea secondary to above: Improving  4. Pulmonary toilette/Aspiration precautions 1. Community acquired pneumonia: Clinically improving. IV antibiotics per ID  2. COPD exacerbation: O2/bronchodilators/IV steroids per Pulmonary  3. Dyspnea secondary to above: Improving  4. Pulmonary toilette/Aspiration precautions  5. PAD: Inability to ambulate: Vascular Surgery consult Dr. Moran. PT evaluation for ambulation

## 2019-03-07 NOTE — PROGRESS NOTE ADULT - SUBJECTIVE AND OBJECTIVE BOX
SUBJECTIVE:    Patient is a 58y old Female who presents with a chief complaint of cough, SOB (07 Mar 2019 07:33)    Currently admitted to medicine with the primary diagnosis of Pneumonia     Today is hospital day 1d. No acute overnight events. Still requiring oxygen and SOB    PAST MEDICAL & SURGICAL HISTORY  Bipolar affective  Hepatitis C  COPD (chronic obstructive pulmonary disease)  No significant past surgical history    SOCIAL HISTORY:  Negative for smoking/alcohol/drug use.     ALLERGIES:  ampicillin (Unknown)  Lithium Carbonate (Unknown)    MEDICATIONS:  STANDING MEDICATIONS  ALBUTerol/ipratropium for Nebulization 3 milliLiter(s) Nebulizer every 6 hours  azithromycin   Tablet 500 milliGRAM(s) Oral daily  buDESOnide 160 MICROgram(s)/formoterol 4.5 MICROgram(s) Inhaler 2 Puff(s) Inhalation two times a day  chlorhexidine 4% Liquid 1 Application(s) Topical <User Schedule>  clonazePAM Tablet 2 milliGRAM(s) Oral two times a day  enoxaparin Injectable 40 milliGRAM(s) SubCutaneous daily  ergocalciferol 19909 Unit(s) Oral every week  escitalopram 10 milliGRAM(s) Oral daily  gabapentin 800 milliGRAM(s) Oral three times a day  methylPREDNISolone sodium succinate Injectable 60 milliGRAM(s) IV Push daily    PRN MEDICATIONS    VITALS:   T(F): 96.2  HR: 77  BP: 129/66  RR: 20  SpO2: 96%    LABS:                        12.3   7.50  )-----------( 133      ( 07 Mar 2019 06:13 )             36.9     03-07    139  |  106  |  19  ----------------------------<  119<H>  4.4   |  21  |  0.6<L>    Ca    9.6      07 Mar 2019 06:13  Mg     1.8     03-07    TPro  6.9  /  Alb  4.0  /  TBili  0.7  /  DBili  x   /  AST  55<H>  /  ALT  42<H>  /  AlkPhos  149<H>  03-05    PT/INR - ( 05 Mar 2019 23:47 )   PT: 11.80 sec;   INR: 1.03 ratio         PTT - ( 05 Mar 2019 23:47 )  PTT:30.4 sec      RADIOLOGY:    < from: Xray Chest 2 Views PA/Lat (03.06.19 @ 01:05) >  Impression:      Right middle lobe opacity.    < end of copied text >      PHYSICAL EXAM:  GEN: No acute distress  LUNGS: rhonchi and wheezes scattered bilaterally.   HEART: S1/S2 present. RRR.   ABD: Soft, non-tender, non-distended. Bowel sounds present  EXT: NC/NC/NE/2+PP/DAWSON  NEURO: AAOX3

## 2019-03-07 NOTE — CONSULT NOTE ADULT - SUBJECTIVE AND OBJECTIVE BOX
HPI: 58 year lady with PMHx COPD not currently on O2, Bipolar disorder, Hep C, herniated disk, personality disorder, smoker presented with cough, SOB, congestion x a few weeks, found to have RLL pneumonia, currently on Zythromax  Patient had LLE angio and bilateral iliac artery angioplasty and stent placement in 2013 with Dr Bonner. She says that after the surgery her LE pain improved but for the last 6 months she has been having bilateral LE rest pain which gets worse on ambulation. She says she can only walk 20 feet before stopping to take a rest because her LE pain gets severe    PAST MEDICAL & SURGICAL HISTORY:  Bipolar affective  Hepatitis C  COPD (chronic obstructive pulmonary disease)  No significant past surgical history    FAMILY HISTORY:  No pertinent family history in first degree relatives    SOCIAL HISTORY:  Smoker    ALLERGIES: ampicillin (Unknown)  Lithium Carbonate (Unknown)    HOME MEDICATIONS:    ALPRAZOLAM 1 MG TABLET:  (06 Mar 2019 03:50)  CLONAZEPAM 2 MG TABLET:  (06 Mar 2019 03:50)  DOXEPIN 50 MG CAPSULE:  (06 Mar 2019 03:50)  ESCITALOPRAM 10 MG TABLET:  (06 Mar 2019 03:50)  GABAPENTIN 800 MG TABLET:  (06 Mar 2019 03:50)  VITAMIN D2 1.25MG(50,000 UNIT):  (06 Mar 2019 03:50)    CURRENT MEDICATIONS  MEDICATIONS (STANDING): ALBUTerol/ipratropium for Nebulization 3 milliLiter(s) Nebulizer every 6 hours  ALPRAZolam 0.5 milliGRAM(s) Oral once  azithromycin   Tablet 500 milliGRAM(s) Oral daily  buDESOnide 160 MICROgram(s)/formoterol 4.5 MICROgram(s) Inhaler 2 Puff(s) Inhalation two times a day  clonazePAM Tablet 2 milliGRAM(s) Oral two times a day  enoxaparin Injectable 40 milliGRAM(s) SubCutaneous daily  ergocalciferol 21694 Unit(s) Oral every week  escitalopram 10 milliGRAM(s) Oral daily  gabapentin 800 milliGRAM(s) Oral three times a day  guaiFENesin  milliGRAM(s) Oral every 12 hours  methylPREDNISolone sodium succinate Injectable 60 milliGRAM(s) IV Push daily    Vitals:   T(C): 36.1 (03-07-19 @ 15:32), Max: 36.1 (03-07-19 @ 15:32)  HR: 72 (03-07-19 @ 15:32) (72 - 77)  BP: 119/68 (03-07-19 @ 15:32) (119/68 - 129/66)  RR: 18 (03-07-19 @ 15:32) (18 - 20)  SpO2: 97% (03-07-19 @ 15:32) (96% - 97%)    Weight (kg): 73 (03-06 @ 05:32)    PHYSICAL EXAM:  GENERAL: NAD,   EXTREMITIES: LEs are Normal in color and temperature. DP/PT doplerable. No ulcers or cyanosis  PSYCH: AAOx3  NEUROLOGY: non-focal deficits    LABS  CBC (03-07 @ 06:13)                              12.3                           7.50    )----------------(  133        68.1  % Neutrophils, 21.5  % Lymphocytes, ANC: 5.11                                36.9<L>    BMP (03-07 @ 06:13)             139     |  106     |  19    		Ca++ --      Ca 9.6                ---------------------------------( 119<H>		Mg 1.8                4.4     |  21      |  0.6<L>			Ph --          MICROBIOLOGY    -> .Blood Blood-Peripheral Culture (03-05 @ 23:48)     NG    NG    No growth to date.    -> .Blood Blood-Peripheral Culture (03-05 @ 23:47)     NG    NG    No growth to date.  -------------------------------------------------------------------------------------------  IMAGING:  Pending

## 2019-03-07 NOTE — CONSULT NOTE ADULT - SUBJECTIVE AND OBJECTIVE BOX
THIS IS A DRAFT    Patient seen and examined earlier today.    Case discussed with house staff assigned.    Complete documentation to follow.    For any question, please contact me:  Dr. Dominique Fulton  Office: [phone] ANASTACIO SIMENTAL  MRN-[id number]    HISTORY OF PRESENT ILLNESS:    58 year lady with PMHx COPD not currently on O2, Bipolar disorder, Hep C presenting with cough, SOB, congestion x a few weeks. Admits to fever, chills, bloody tinged diarrhea for the past few days.  Denied HA, blurry vision, cp, palpitations, syncope, dizziness, LE swelling, hemoptysis, abdominal pain, n/v/c, numbness, tingling, weakness, rash, urinary incontinance, dysuria, hematuria, melena. Of note, she states that 6 years ago she was on home oxygen after she was on a ventilator but thereafter did not need it anymore.    In ED, bp  144/70    RR  20   90% on 4LNC  T98.8F. She desaturated to low 80s but improved with 4L NC. CXR showed RLL PNA.   Pt currently comfortable, feels slightly better since inital presentation  still smoking daily      PMH/PSH:  PAST MEDICAL & SURGICAL HISTORY:  Bipolar affective  Hepatitis C  COPD (chronic obstructive pulmonary disease)  No significant past surgical history    ALLERGIES:  Allergies    ampicillin (Unknown)  Lithium Carbonate (Unknown)    Intolerances      SOCIAL HABITS:  tobacco abuse    FAMILY HISTORY:   FAMILY HISTORY:  No pertinent family history in first degree relatives      REVIEW OF SYSTEM:  Elements of review of systems are negative or non-applicable except as noted above in HPI section.       HOME MEDICATIONS:  ALPRAZOLAM 1 MG TABLET  CLONAZEPAM 2 MG TABLET  DOXEPIN 50 MG CAPSULE  ESCITALOPRAM 10 MG TABLET  GABAPENTIN 800 MG TABLET  VITAMIN D2 1.25MG(50,000 UNIT)    MEDICATIONS:  MEDICATIONS  (STANDING):  ALBUTerol/ipratropium for Nebulization 3 milliLiter(s) Nebulizer every 6 hours  azithromycin   Tablet 500 milliGRAM(s) Oral daily  buDESOnide 160 MICROgram(s)/formoterol 4.5 MICROgram(s) Inhaler 2 Puff(s) Inhalation two times a day  chlorhexidine 4% Liquid 1 Application(s) Topical <User Schedule>  clonazePAM Tablet 2 milliGRAM(s) Oral two times a day  enoxaparin Injectable 40 milliGRAM(s) SubCutaneous daily  ergocalciferol 15253 Unit(s) Oral every week  escitalopram 10 milliGRAM(s) Oral daily  gabapentin 800 milliGRAM(s) Oral three times a day  guaiFENesin  milliGRAM(s) Oral every 12 hours  methylPREDNISolone sodium succinate Injectable 60 milliGRAM(s) IV Push daily    MEDICATIONS  (PRN):        VITALS:   Vital Signs Last 24 Hrs  T(C): 36.1 (07 Mar 2019 15:32), Max: 36.1 (07 Mar 2019 15:32)  T(F): 97 (07 Mar 2019 15:32), Max: 97 (07 Mar 2019 15:32)  HR: 72 (07 Mar 2019 15:32) (72 - 77)  BP: 119/68 (07 Mar 2019 15:32) (119/68 - 129/66)  BP(mean): --  RR: 18 (07 Mar 2019 15:32) (18 - 20)  SpO2: 97% (07 Mar 2019 15:32) (96% - 97%)        PHYSICAL EXAM:    GENERAL: NAD  HEAD:  Atraumatic, Normocephalic  NECK: Supple, No JVD  CHEST/LUNG: diffuse wheeze  HEART: Regular rate and rhythm; No murmurs  ABDOMEN: Soft, Nontender, Nondistended  EXTREMITIES:  Good peripheral Pulses, No clubbing, cyanosis, or edema      LABS:                        12.3   7.50  )-----------( 133      ( 07 Mar 2019 06:13 )             36.9     03-07    139  |  106  |  19  ----------------------------<  119<H>  4.4   |  21  |  0.6<L>    Ca    9.6      07 Mar 2019 06:13  Mg     1.8     03-07    TPro  6.9  /  Alb  4.0  /  TBili  0.7  /  DBili  x   /  AST  55<H>  /  ALT  42<H>  /  AlkPhos  149<H>  03-05    LIVER FUNCTIONS - ( 05 Mar 2019 23:47 )  Alb: 4.0 g/dL / Pro: 6.9 g/dL / ALK PHOS: 149 U/L / ALT: 42 U/L / AST: 55 U/L / GGT: x               PT/INR - ( 05 Mar 2019 23:47 )   PT: 11.80 sec;   INR: 1.03 ratio         PTT - ( 05 Mar 2019 23:47 )  PTT:30.4 sec    Culture - Blood (collected 03-05-19 @ 23:48)  Source: .Blood Blood-Peripheral  Preliminary Report (03-07-19 @ 15:01):    No growth to date.    Culture - Blood (collected 03-05-19 @ 23:47)  Source: .Blood Blood-Peripheral  Preliminary Report (03-07-19 @ 15:01):    No growth to date.            ABG & VENT SETTINGS (when applicable)        DIAGNOSTIC STUDIES:  < from: Xray Chest 2 Views PA/Lat (03.06.19 @ 01:05) >    Impression:      Right middle lobe opacity.    < end of copied text >

## 2019-03-07 NOTE — PROGRESS NOTE ADULT - SUBJECTIVE AND OBJECTIVE BOX
ANASTACIO SIMENTAL  58y  Female      Patient is a 58y old  Female who presents with a chief complaint of cough, SOB (06 Mar 2019 12:10)      INTERVAL HPI/OVERNIGHT EVENTS: Feeling better      REVIEW OF SYSTEMS:  CONSTITUTIONAL: No fever, weight loss. Fatigue  EYES: No eye pain, visual disturbances, or discharge  ENMT:  No difficulty hearing, tinnitus, vertigo; No sinus or throat pain  NECK: No pain or stiffness  BREASTS: No pain, masses, or nipple discharge  RESPIRATORY: Productive cough /wheezing, no chills or hemoptysis; Shortness of breath improving  CARDIOVASCULAR: No chest pain, palpitations, dizziness, or leg swelling  GASTROINTESTINAL: No abdominal or epigastric pain. No nausea, vomiting, or hematemesis; No diarrhea or constipation. No melena or hematochezia.  GENITOURINARY: No dysuria, frequency, hematuria, or incontinence  NEUROLOGICAL: No headaches, memory loss, loss of strength, numbness, or tremors  SKIN: No itching, burning, rashes, or lesions   LYMPH NODES: No enlarged glands  ENDOCRINE: No heat or cold intolerance; No hair loss  MUSCULOSKELETAL: No joint pain or swelling; No muscle, back, or extremity pain  PSYCHIATRIC: No depression, anxiety, mood swings, or difficulty sleeping  HEME/LYMPH: No easy bruising, or bleeding gums  ALLERY AND IMMUNOLOGIC: No hives or eczema    T(C): 35.6 (03-06-19 @ 23:31), Max: 36.4 (03-06-19 @ 15:36)  HR: 73 (03-06-19 @ 23:31) (65 - 73)  BP: 120/68 (03-06-19 @ 23:31) (118/69 - 120/68)  RR: 18 (03-06-19 @ 23:31) (18 - 19)  SpO2: 97% (03-07-19 @ 01:37) (96% - 97%)  Wt(kg): --Vital Signs Last 24 Hrs  T(C): 35.6 (06 Mar 2019 23:31), Max: 36.4 (06 Mar 2019 15:36)  T(F): 96 (06 Mar 2019 23:31), Max: 97.6 (06 Mar 2019 15:36)  HR: 73 (06 Mar 2019 23:31) (65 - 73)  BP: 120/68 (06 Mar 2019 23:31) (118/69 - 120/68)  BP(mean): --  RR: 18 (06 Mar 2019 23:31) (18 - 19)  SpO2: 97% (07 Mar 2019 01:37) (96% - 97%)    PHYSICAL EXAM:  GENERAL: NAD, well-groomed, well-developed  HEAD:  Atraumatic, Normocephalic  EYES: EOMI, PERRLA, conjunctiva and sclera clear  ENMT: No tonsillar erythema, exudates, or enlargement; Moist mucous membranes, Good dentition, No lesions  NECK: Supple, No JVD, Normal thyroid  NERVOUS SYSTEM:  Alert & Oriented X3, Good concentration; Motor Strength 5/5 B/L upper and lower extremities; DTRs 2+ intact and symmetric  CHEST/LUNG: Clear to percussion bilaterally; scattered rhonchi, wheezing, crackles on RLL  HEART: Regular rate and rhythm; No murmurs, rubs, or gallops  ABDOMEN: Soft, Nontender, Nondistended; Bowel sounds present  EXTREMITIES:  2+ Peripheral Pulses, No clubbing, cyanosis, or edema  LYMPH: No lymphadenopathy noted  SKIN: No rashes or lesions    Consultant(s) Notes Reviewed:  [x ] YES  [ ] NO    Discussed with Consultants/Other Providers [ x] YES     LABS                         14.1   8.89  )-----------( 144      ( 05 Mar 2019 23:47 )             41.0   03-05    138  |  98  |  12  ----------------------------<  110<H>  3.8   |  20  |  0.8    Ca    9.5      05 Mar 2019 23:47    TPro  6.9  /  Alb  4.0  /  TBili  0.7  /  DBili  x   /  AST  55<H>  /  ALT  42<H>  /  AlkPhos  149<H>  03-05        RADIOLOGY & ADDITIONAL TESTS:    Imaging Personally Reviewed:  [ ] YES  [ ] NO    MEDICATIONS  (STANDING):  ALBUTerol/ipratropium for Nebulization 3 milliLiter(s) Nebulizer every 6 hours  azithromycin   Tablet 500 milliGRAM(s) Oral daily  buDESOnide 160 MICROgram(s)/formoterol 4.5 MICROgram(s) Inhaler 2 Puff(s) Inhalation two times a day  chlorhexidine 4% Liquid 1 Application(s) Topical <User Schedule>  clonazePAM Tablet 2 milliGRAM(s) Oral two times a day  enoxaparin Injectable 40 milliGRAM(s) SubCutaneous daily  ergocalciferol 82569 Unit(s) Oral every week  escitalopram 10 milliGRAM(s) Oral daily  gabapentin 800 milliGRAM(s) Oral three times a day  methylPREDNISolone sodium succinate Injectable 60 milliGRAM(s) IV Push daily    MEDICATIONS  (PRN):  HEALTH ISSUES - PROBLEM Dx: ANASTACIO SIMENTAL  58y  Female      Patient is a 58y old  Female who presents with a chief complaint of cough, SOB (06 Mar 2019 12:10)      INTERVAL HPI/OVERNIGHT EVENTS: Feeling better      REVIEW OF SYSTEMS:  CONSTITUTIONAL: No fever, weight loss. Fatigue  EYES: No eye pain, visual disturbances, or discharge  ENMT:  No difficulty hearing, tinnitus, vertigo; No sinus or throat pain  NECK: No pain or stiffness  BREASTS: No pain, masses, or nipple discharge  RESPIRATORY: Productive cough /wheezing, no chills or hemoptysis; Shortness of breath improving  CARDIOVASCULAR: No chest pain, palpitations, dizziness, or leg swelling  GASTROINTESTINAL: No abdominal or epigastric pain. No nausea, vomiting, or hematemesis; No diarrhea or constipation. No melena or hematochezia.  GENITOURINARY: No dysuria, frequency, hematuria, or incontinence  NEUROLOGICAL: No headaches, memory loss, loss of strength, numbness, or tremors  SKIN: No itching, burning, rashes, or lesions   LYMPH NODES: No enlarged glands  ENDOCRINE: No heat or cold intolerance; No hair loss  MUSCULOSKELETAL: No joint pain or swelling; Lower extremity pain on ambulation  PSYCHIATRIC: No depression, anxiety, mood swings, or difficulty sleeping  HEME/LYMPH: No easy bruising, or bleeding gums  ALLERY AND IMMUNOLOGIC: No hives or eczema    T(C): 35.6 (03-06-19 @ 23:31), Max: 36.4 (03-06-19 @ 15:36)  HR: 73 (03-06-19 @ 23:31) (65 - 73)  BP: 120/68 (03-06-19 @ 23:31) (118/69 - 120/68)  RR: 18 (03-06-19 @ 23:31) (18 - 19)  SpO2: 97% (03-07-19 @ 01:37) (96% - 97%)  Wt(kg): --Vital Signs Last 24 Hrs  T(C): 35.6 (06 Mar 2019 23:31), Max: 36.4 (06 Mar 2019 15:36)  T(F): 96 (06 Mar 2019 23:31), Max: 97.6 (06 Mar 2019 15:36)  HR: 73 (06 Mar 2019 23:31) (65 - 73)  BP: 120/68 (06 Mar 2019 23:31) (118/69 - 120/68)  BP(mean): --  RR: 18 (06 Mar 2019 23:31) (18 - 19)  SpO2: 97% (07 Mar 2019 01:37) (96% - 97%)    PHYSICAL EXAM:  GENERAL: NAD, well-groomed, well-developed  HEAD:  Atraumatic, Normocephalic  EYES: EOMI, PERRLA, conjunctiva and sclera clear  ENMT: No tonsillar erythema, exudates, or enlargement; Moist mucous membranes, Good dentition, No lesions  NECK: Supple, No JVD, Normal thyroid  NERVOUS SYSTEM:  Alert & Oriented X3, Good concentration; Motor Strength 5/5 B/L upper and lower extremities; DTRs 2+ intact and symmetric  CHEST/LUNG: Clear to percussion bilaterally; scattered rhonchi, wheezing, crackles on RLL  HEART: Regular rate and rhythm; No murmurs, rubs, or gallops  ABDOMEN: Soft, Nontender, Nondistended; Bowel sounds present  EXTREMITIES:  Poor distal lower extremity peripheral  pulses, No clubbing, cyanosis, or edema  LYMPH: No lymphadenopathy noted  SKIN: No rashes or lesions    Consultant(s) Notes Reviewed:  [x ] YES  [ ] NO    Discussed with Consultants/Other Providers [ x] YES     LABS                         14.1   8.89  )-----------( 144      ( 05 Mar 2019 23:47 )             41.0   03-05    138  |  98  |  12  ----------------------------<  110<H>  3.8   |  20  |  0.8    Ca    9.5      05 Mar 2019 23:47    TPro  6.9  /  Alb  4.0  /  TBili  0.7  /  DBili  x   /  AST  55<H>  /  ALT  42<H>  /  AlkPhos  149<H>  03-05        RADIOLOGY & ADDITIONAL TESTS:    Imaging Personally Reviewed:  [ ] YES  [ ] NO    MEDICATIONS  (STANDING):  ALBUTerol/ipratropium for Nebulization 3 milliLiter(s) Nebulizer every 6 hours  azithromycin   Tablet 500 milliGRAM(s) Oral daily  buDESOnide 160 MICROgram(s)/formoterol 4.5 MICROgram(s) Inhaler 2 Puff(s) Inhalation two times a day  chlorhexidine 4% Liquid 1 Application(s) Topical <User Schedule>  clonazePAM Tablet 2 milliGRAM(s) Oral two times a day  enoxaparin Injectable 40 milliGRAM(s) SubCutaneous daily  ergocalciferol 71541 Unit(s) Oral every week  escitalopram 10 milliGRAM(s) Oral daily  gabapentin 800 milliGRAM(s) Oral three times a day  methylPREDNISolone sodium succinate Injectable 60 milliGRAM(s) IV Push daily    MEDICATIONS  (PRN):  HEALTH ISSUES - PROBLEM Dx:

## 2019-03-07 NOTE — PROGRESS NOTE ADULT - ASSESSMENT
58 year lady with PMHx COPD not currently on O2, Bipolar disorder, Hep C presenting with cough, SOB, congestion x a few weeks.     Cough, congestion, SOB 2/2 COPD exacerbation due RLL PNA. Possible gram(-) PNA.  -admit to medicine  -c/w azithromycin 250mg x 5  -f/u cultures  - WIll need pulm eval - pending  - IV steroids 60qd  - Nebs q6hr   - symbicort daily     PVD  - difficulty with ambulation   - vascular eval as per PMD     Forgetfulness, disorientation - resolved  -f/u B12, folate  -f/u CTHNC negative    Bipolar   -stable  -c/w klonopin    Hep C  -stable    Dispo  -from home  -ambulates independently with walker  -anticipated discharge when medically stable  -f/u PT/rehab    DVT ppx  GI ppx  CHG 4% daily and PRN  OOBTC  DASH/TLC   FULL CODE

## 2019-03-07 NOTE — PROGRESS NOTE ADULT - SUBJECTIVE AND OBJECTIVE BOX
ANASTACIO SIMENTAL  58y, Female      OVERNIGHT EVENTS:  no acute events overnight  still coughing/wheezing  reports a "lump" in her stool, reports there is blood and it is "dark"  TSH 0.17    ROS negative except as per above    VITALS:  T(F): 96, Max: 97.6 (03-06-19 @ 15:36)  HR: 73  BP: 120/68  RR: 18Vital Signs Last 24 Hrs  T(C): 35.6 (06 Mar 2019 23:31), Max: 36.4 (06 Mar 2019 15:36)  T(F): 96 (06 Mar 2019 23:31), Max: 97.6 (06 Mar 2019 15:36)  HR: 73 (06 Mar 2019 23:31) (65 - 73)  BP: 120/68 (06 Mar 2019 23:31) (118/69 - 120/68)  BP(mean): --  RR: 18 (06 Mar 2019 23:31) (18 - 19)  SpO2: 97% (07 Mar 2019 01:37) (96% - 97%)    PHYSICAL EXAM  Gen: Awake and alert, non-toxic appearing, NAD  HEENT: NCAT. EOMI. MMM.   Neck: Supple, no cervical LAD  CV: RRR,   Lungs: +wheezing  Abd: Soft. NTND  Extr: wwp, no edema  Skin: no rash  Neuro: No focal deficits  Lines: clean      TESTS & MEASUREMENTS:                        12.3   7.50  )-----------( 133      ( 07 Mar 2019 06:13 )             36.9     03-07    139  |  106  |  19  ----------------------------<  119<H>  4.4   |  21  |  0.6<L>    Ca    9.6      07 Mar 2019 06:13  Mg     1.8     03-07    TPro  6.9  /  Alb  4.0  /  TBili  0.7  /  DBili  x   /  AST  55<H>  /  ALT  42<H>  /  AlkPhos  149<H>  03-05    LIVER FUNCTIONS - ( 05 Mar 2019 23:47 )  Alb: 4.0 g/dL / Pro: 6.9 g/dL / ALK PHOS: 149 U/L / ALT: 42 U/L / AST: 55 U/L / GGT: x                 RADIOLOGY & ADDITIONAL TESTS:    ANTIBIOTICS:    levoFLOXacin IVPB   50 mL/Hr IV Intermittent (03-06-19 @ 01:14)    levoFLOXacin IVPB   100 mL/Hr IV Intermittent (03-06-19 @ 06:04)        azithromycin   Tablet 500 milliGRAM(s) Oral daily

## 2019-03-07 NOTE — PROGRESS NOTE ADULT - ASSESSMENT
58 year lady with PMHx COPD not currently on O2, Bipolar disorder, Hep C (15years ago, IV drug abuse) presenting with fevers at home, sore throat, cough, SOB, congestion for 4 days   Sepsis ruled out on admission  CXR Right middle lobe opacity.    Suspect viral PNA, COPD exacerbation  No leukocytosis    - azithromycin 250 q24h x 5 days  - c/w nebulizer treatments  - consider steroids for COPD exacerbation  - trend LFTS  - thyroid w/u per primary team

## 2019-03-07 NOTE — CONSULT NOTE ADULT - ASSESSMENT
Acute respiratory failure  Copd exacerabtion  Suspected pneumonia  Continous tobacco abuse/Smoking cessation counseling    02 via nc  increase solumedrol 60 mg iv q 12hours  symbicort 160 mg 2 puffs bid  cont azithromycin, add rocephin 1 gram iv q 24 hours  pan cultures  obtain ct chest non contrast  smoking cessation counseling   dvt/gi px

## 2019-03-07 NOTE — CONSULT NOTE ADULT - ASSESSMENT
ASSESSMENT: 58 year lady with PMHx COPD not currently on O2, Bipolar disorder, Hep C, herniated disk, personality disorder, smoker presented with cough, SOB, congestion x a few weeks, found to have RLL pneumonia, currently on Zythromax  Patient had LLE angio and bilateral iliac artery angioplasty and stent placement in 2013 with Dr Bonner. She says that after the surgery her LE pain improved but for the last 6 months she has been having bilateral LE rest pain which gets worse on ambulation. She says she can only walk 20 feet before stopping to take a rest because her LE pain gets severe    PLAN:   Obtain a lower extremity arterial duplex  Aspirin and statin    Discussed with vascular fellow Dr Snell

## 2019-03-08 RX ORDER — ASPIRIN/CALCIUM CARB/MAGNESIUM 324 MG
81 TABLET ORAL DAILY
Qty: 0 | Refills: 0 | Status: DISCONTINUED | OUTPATIENT
Start: 2019-03-08 | End: 2019-03-09

## 2019-03-08 RX ADMIN — Medication 60 MILLIGRAM(S): at 18:55

## 2019-03-08 RX ADMIN — ESCITALOPRAM OXALATE 10 MILLIGRAM(S): 10 TABLET, FILM COATED ORAL at 12:50

## 2019-03-08 RX ADMIN — Medication 2 MILLIGRAM(S): at 06:44

## 2019-03-08 RX ADMIN — BUDESONIDE AND FORMOTEROL FUMARATE DIHYDRATE 2 PUFF(S): 160; 4.5 AEROSOL RESPIRATORY (INHALATION) at 08:43

## 2019-03-08 RX ADMIN — Medication 600 MILLIGRAM(S): at 17:21

## 2019-03-08 RX ADMIN — GABAPENTIN 800 MILLIGRAM(S): 400 CAPSULE ORAL at 06:44

## 2019-03-08 RX ADMIN — Medication 3 MILLILITER(S): at 08:43

## 2019-03-08 RX ADMIN — GABAPENTIN 800 MILLIGRAM(S): 400 CAPSULE ORAL at 12:50

## 2019-03-08 RX ADMIN — Medication 60 MILLIGRAM(S): at 06:44

## 2019-03-08 RX ADMIN — Medication 600 MILLIGRAM(S): at 06:44

## 2019-03-08 RX ADMIN — Medication 3 MILLILITER(S): at 15:14

## 2019-03-08 RX ADMIN — AZITHROMYCIN 500 MILLIGRAM(S): 500 TABLET, FILM COATED ORAL at 12:50

## 2019-03-08 RX ADMIN — GABAPENTIN 800 MILLIGRAM(S): 400 CAPSULE ORAL at 21:52

## 2019-03-08 RX ADMIN — Medication 81 MILLIGRAM(S): at 12:50

## 2019-03-08 RX ADMIN — Medication 2 MILLIGRAM(S): at 17:21

## 2019-03-08 NOTE — PROGRESS NOTE ADULT - ASSESSMENT
58 year lady with PMHx COPD not currently on O2, Bipolar disorder, Hep C presenting with cough, SOB, congestion x a few weeks.     #Cough, congestion, SOB 2/2 COPD exacerbation due RLL PNA. Possible gram(-) PNA.  -admit to medicine  -c/w azithromycin   -f/u cultures  - IV steroids 60mg q12  - Nebs q6hr   - symbicort daily   - Check CT Chest NC     #PVD  - difficulty with ambulation   - ASA, statin and check bilateral arterial duplex  - vascular on board     #Forgetfulness, disorientation - resolved  -f/u B12, folate  -f/u CTHNC negative    #Bipolar   -stable  -c/w klonopin    Hep C  -stable    Dispo  -from home  -ambulates independently with walker  -anticipated discharge when medically stable  -f/u PT/rehab    DVT ppx  GI ppx  CHG 4% daily and PRN  OOBTC  DASH/TLC   FULL CODE

## 2019-03-08 NOTE — PROGRESS NOTE ADULT - SUBJECTIVE AND OBJECTIVE BOX
ANASTACIO SIMENTAL  58y  Female      Patient is a 58y old  Female who presents with a chief complaint of cough, SOB (07 Mar 2019 20:26)      INTERVAL HPI/OVERNIGHT EVENTS: Still wheezing (also still SMOKING)      REVIEW OF SYSTEMS:  CONSTITUTIONAL: No fever, weight loss, or fatigue  EYES: No eye pain, visual disturbances, or discharge  ENMT:  No difficulty hearing, tinnitus, vertigo; No sinus or throat pain  NECK: No pain or stiffness  BREASTS: No pain, masses, or nipple discharge  RESPIRATORY: Dry cough; Wheezing, no chills or hemoptysis; QUARLES  CARDIOVASCULAR: No chest pain, palpitations, dizziness, or leg swelling  GASTROINTESTINAL: No abdominal or epigastric pain. No nausea, vomiting, or hematemesis; No diarrhea or constipation. No melena or hematochezia.  GENITOURINARY: No dysuria, frequency, hematuria, or incontinence  NEUROLOGICAL: No headaches, memory loss, loss of strength, numbness, or tremors  SKIN: No itching, burning, rashes, or lesions   LYMPH NODES: No enlarged glands  ENDOCRINE: No heat or cold intolerance; No hair loss  MUSCULOSKELETAL: No joint pain or swelling; No muscle, back, or extremity pain  PSYCHIATRIC: No depression, anxiety, mood swings, or difficulty sleeping  HEME/LYMPH: No easy bruising, or bleeding gums  ALLERY AND IMMUNOLOGIC: No hives or eczema    T(C): 36.1 (03-07-19 @ 15:32), Max: 36.1 (03-07-19 @ 15:32)  HR: 72 (03-07-19 @ 15:32) (72 - 77)  BP: 119/68 (03-07-19 @ 15:32) (119/68 - 129/66)  RR: 18 (03-07-19 @ 15:32) (18 - 20)  SpO2: 97% (03-07-19 @ 15:32) (96% - 97%)  Wt(kg): --Vital Signs Last 24 Hrs  T(C): 36.1 (07 Mar 2019 15:32), Max: 36.1 (07 Mar 2019 15:32)  T(F): 97 (07 Mar 2019 15:32), Max: 97 (07 Mar 2019 15:32)  HR: 72 (07 Mar 2019 15:32) (72 - 77)  BP: 119/68 (07 Mar 2019 15:32) (119/68 - 129/66)  BP(mean): --  RR: 18 (07 Mar 2019 15:32) (18 - 20)  SpO2: 97% (07 Mar 2019 15:32) (96% - 97%)    PHYSICAL EXAM:  GENERAL: NAD, well-groomed, well-developed  HEAD:  Atraumatic, Normocephalic  EYES: EOMI, PERRLA, conjunctiva and sclera clear  ENMT: No tonsillar erythema, exudates, or enlargement; Moist mucous membranes, Good dentition, No lesions  NECK: Supple, No JVD, Normal thyroid  NERVOUS SYSTEM:  Alert & Oriented X3, Good concentration; Motor Strength 5/5 B/L upper and lower extremities; DTRs 2+ intact and symmetric  CHEST/LUNG: Diffuse wheezing/rhonchi/crackles  HEART: Regular rate and rhythm; No murmurs, rubs, or gallops  ABDOMEN: Soft, Nontender, Nondistended; Bowel sounds present  EXTREMITIES:  Poor distal lower extremity peripheral  pulses, No clubbing, cyanosis, or edema  LYMPH: No lymphadenopathy noted  SKIN: No rashes or lesions    Consultant(s) Notes Reviewed:  [x ] YES  [ ] NO    Discussed with Consultants/Other Providers [ x] YES     LABS                         12.3   7.50  )-----------( 133      ( 07 Mar 2019 06:13 )             36.9   03-07    139  |  106  |  19  ----------------------------<  119<H>  4.4   |  21  |  0.6<L>    Ca    9.6      07 Mar 2019 06:13  Mg     1.8     03-07          RADIOLOGY & ADDITIONAL TESTS:    Imaging Personally Reviewed:  [ ] YES  [ ] NO    MEDICATIONS  (STANDING):  ALBUTerol/ipratropium for Nebulization 3 milliLiter(s) Nebulizer every 6 hours  azithromycin   Tablet 500 milliGRAM(s) Oral daily  buDESOnide 160 MICROgram(s)/formoterol 4.5 MICROgram(s) Inhaler 2 Puff(s) Inhalation two times a day  chlorhexidine 4% Liquid 1 Application(s) Topical <User Schedule>  clonazePAM Tablet 2 milliGRAM(s) Oral two times a day  enoxaparin Injectable 40 milliGRAM(s) SubCutaneous daily  ergocalciferol 82226 Unit(s) Oral every week  escitalopram 10 milliGRAM(s) Oral daily  gabapentin 800 milliGRAM(s) Oral three times a day  guaiFENesin  milliGRAM(s) Oral every 12 hours  methylPREDNISolone sodium succinate Injectable 60 milliGRAM(s) IV Push daily    MEDICATIONS  (PRN):  HEALTH ISSUES - PROBLEM Dx:

## 2019-03-08 NOTE — PROGRESS NOTE ADULT - SUBJECTIVE AND OBJECTIVE BOX
THIS IS A DRAFT    Patient seen and examined earlier today.    Case discussed with house staff assigned.    Complete documentation to follow.    For any question, please contact me:  Dr. Dominique Fulton  Office: [phone] ANASTACIO SIMENTAL  58y, Female  Allergy: ampicillin (Unknown)  Lithium Carbonate (Unknown)      LAST 24-Hr EVENTS:  pt seen examined  still with cough, wheezing jerry    VITALS:  T(F): 96.9 (03-08-19 @ 15:45), Max: 96.9 (03-08-19 @ 15:45)  HR: 64 (03-08-19 @ 15:45)  BP: 127/76 (03-08-19 @ 15:45) (127/76 - 129/80)  RR: 18 (03-08-19 @ 15:45)  SpO2: 99% (03-08-19 @ 15:45)    PHYSICAL EXAM:    GENERAL: NAD  NECK: Supple, No JVD  CHEST/LUNG: diffuse wheeze  HEART: Regular rate and rhythm  ABDOMEN: Soft, Nontender, Nondistended  EXTREMITIES:  No clubbing, edema absent        TESTS & MEASUREMENTS:                          12.3   7.50  )-----------( 133      ( 07 Mar 2019 06:13 )             36.9       03-07    139  |  106  |  19  ----------------------------<  119<H>  4.4   |  21  |  0.6<L>    Ca    9.6      07 Mar 2019 06:13  Mg     1.8     03-07              Culture - Blood (collected 03-05-19 @ 23:48)  Source: .Blood Blood-Peripheral  Preliminary Report (03-07-19 @ 15:01):    No growth to date.    Culture - Blood (collected 03-05-19 @ 23:47)  Source: .Blood Blood-Peripheral  Preliminary Report (03-07-19 @ 15:01):    No growth to date.        RADIOLOGY & ADDITIONAL TESTS:    < from: CT Chest No Cont (03.08.19 @ 13:49) >  IMPRESSION:    Groundglass opacifications as described in a crazy paving type   distribution. While findings may be indicative of small airways disease,   other entities are certainly within thedifferential including different   types of hypersensitivity pneumonitis.     < end of copied text >      MEDICATIONS:  MEDICATIONS  (STANDING):  ALBUTerol/ipratropium for Nebulization 3 milliLiter(s) Nebulizer every 6 hours  aspirin  chewable 81 milliGRAM(s) Oral daily  azithromycin   Tablet 500 milliGRAM(s) Oral daily  buDESOnide 160 MICROgram(s)/formoterol 4.5 MICROgram(s) Inhaler 2 Puff(s) Inhalation two times a day  chlorhexidine 4% Liquid 1 Application(s) Topical <User Schedule>  clonazePAM Tablet 2 milliGRAM(s) Oral two times a day  enoxaparin Injectable 40 milliGRAM(s) SubCutaneous daily  ergocalciferol 45425 Unit(s) Oral every week  escitalopram 10 milliGRAM(s) Oral daily  gabapentin 800 milliGRAM(s) Oral three times a day  guaiFENesin  milliGRAM(s) Oral every 12 hours  methylPREDNISolone sodium succinate Injectable 60 milliGRAM(s) IV Push every 12 hours    MEDICATIONS  (PRN):

## 2019-03-08 NOTE — PROGRESS NOTE ADULT - SUBJECTIVE AND OBJECTIVE BOX
SUBJECTIVE:    Patient is a 58y old Female who presents with a chief complaint of cough, SOB (08 Mar 2019 06:34)    Currently admitted to medicine with the primary diagnosis of Pneumonia     Today is hospital day 2d. No acute overnight events.     PAST MEDICAL & SURGICAL HISTORY  Bipolar affective  Hepatitis C  COPD (chronic obstructive pulmonary disease)  No significant past surgical history    SOCIAL HISTORY:  Negative for smoking/alcohol/drug use.     ALLERGIES:  ampicillin (Unknown)  Lithium Carbonate (Unknown)    MEDICATIONS:  STANDING MEDICATIONS  ALBUTerol/ipratropium for Nebulization 3 milliLiter(s) Nebulizer every 6 hours  aspirin  chewable 81 milliGRAM(s) Oral daily  azithromycin   Tablet 500 milliGRAM(s) Oral daily  buDESOnide 160 MICROgram(s)/formoterol 4.5 MICROgram(s) Inhaler 2 Puff(s) Inhalation two times a day  chlorhexidine 4% Liquid 1 Application(s) Topical <User Schedule>  clonazePAM Tablet 2 milliGRAM(s) Oral two times a day  enoxaparin Injectable 40 milliGRAM(s) SubCutaneous daily  ergocalciferol 64130 Unit(s) Oral every week  escitalopram 10 milliGRAM(s) Oral daily  gabapentin 800 milliGRAM(s) Oral three times a day  guaiFENesin  milliGRAM(s) Oral every 12 hours  methylPREDNISolone sodium succinate Injectable 60 milliGRAM(s) IV Push every 12 hours    PRN MEDICATIONS    VITALS:   T(F): 96.4  HR: 61  BP: 129/80  RR: 20  SpO2: 97%    LABS:                        12.3   7.50  )-----------( 133      ( 07 Mar 2019 06:13 )             36.9     03-07    139  |  106  |  19  ----------------------------<  119<H>  4.4   |  21  |  0.6<L>    Ca    9.6      07 Mar 2019 06:13  Mg     1.8     03-07      Culture - Blood (collected 05 Mar 2019 23:48)  Source: .Blood Blood-Peripheral  Preliminary Report (07 Mar 2019 15:01):    No growth to date.    Culture - Blood (collected 05 Mar 2019 23:47)  Source: .Blood Blood-Peripheral  Preliminary Report (07 Mar 2019 15:01):    No growth to date.          RADIOLOGY:    PHYSICAL EXAM:  GEN: No acute distress  LUNGS: Clear to auscultation bilaterally   HEART: S1/S2 present. RRR.   ABD: Soft, non-tender, non-distended. Bowel sounds present  EXT: NC/NC/NE/2+PP/DAWSON  NEURO: [id number]

## 2019-03-08 NOTE — PROGRESS NOTE ADULT - SUBJECTIVE AND OBJECTIVE BOX
ANASTACIO SIMENTAL  58y, Female  Allergy: ampicillin (Unknown)  Lithium Carbonate (Unknown)      LAST 24-Hr EVENTS:  pt seen examined  still with cough, wheezing jerry    VITALS:  T(F): 96.9 (03-08-19 @ 15:45), Max: 96.9 (03-08-19 @ 15:45)  HR: 64 (03-08-19 @ 15:45)  BP: 127/76 (03-08-19 @ 15:45) (127/76 - 129/80)  RR: 18 (03-08-19 @ 15:45)  SpO2: 99% (03-08-19 @ 15:45)    PHYSICAL EXAM:    GENERAL: NAD  NECK: Supple, No JVD  CHEST/LUNG: diffuse wheeze  HEART: Regular rate and rhythm  ABDOMEN: Soft, Nontender, Nondistended  EXTREMITIES:  No clubbing, edema absent        TESTS & MEASUREMENTS:                          12.3   7.50  )-----------( 133      ( 07 Mar 2019 06:13 )             36.9       03-07    139  |  106  |  19  ----------------------------<  119<H>  4.4   |  21  |  0.6<L>    Ca    9.6      07 Mar 2019 06:13  Mg     1.8     03-07              Culture - Blood (collected 03-05-19 @ 23:48)  Source: .Blood Blood-Peripheral  Preliminary Report (03-07-19 @ 15:01):    No growth to date.    Culture - Blood (collected 03-05-19 @ 23:47)  Source: .Blood Blood-Peripheral  Preliminary Report (03-07-19 @ 15:01):    No growth to date.        RADIOLOGY & ADDITIONAL TESTS:    < from: CT Chest No Cont (03.08.19 @ 13:49) >  IMPRESSION:    Groundglass opacifications as described in a crazy paving type   distribution. While findings may be indicative of small airways disease,   other entities are certainly within thedifferential including different   types of hypersensitivity pneumonitis.     < end of copied text >      MEDICATIONS:  MEDICATIONS  (STANDING):  ALBUTerol/ipratropium for Nebulization 3 milliLiter(s) Nebulizer every 6 hours  aspirin  chewable 81 milliGRAM(s) Oral daily  azithromycin   Tablet 500 milliGRAM(s) Oral daily  buDESOnide 160 MICROgram(s)/formoterol 4.5 MICROgram(s) Inhaler 2 Puff(s) Inhalation two times a day  chlorhexidine 4% Liquid 1 Application(s) Topical <User Schedule>  clonazePAM Tablet 2 milliGRAM(s) Oral two times a day  enoxaparin Injectable 40 milliGRAM(s) SubCutaneous daily  ergocalciferol 19854 Unit(s) Oral every week  escitalopram 10 milliGRAM(s) Oral daily  gabapentin 800 milliGRAM(s) Oral three times a day  guaiFENesin  milliGRAM(s) Oral every 12 hours  methylPREDNISolone sodium succinate Injectable 60 milliGRAM(s) IV Push every 12 hours    MEDICATIONS  (PRN):

## 2019-03-08 NOTE — PROGRESS NOTE ADULT - ASSESSMENT
Acute respiratory failure  Copd exacerabtion  Abnormal Ct Chest/ILD verse Pulmonary edema (GG opacities/intralobular septal thickening)  Continuos tobacco abuse/Smoking cessation counseling  No evidence of pneumonia    02 via nc  solumedrol 60 mg iv q 12hours  symbicort 160 mg 2 puffs bid  cont azithromycin x 5 days total  d/c rocephin  ct chest reviewed  obtain pro bnp, echo  avoid volume overload  smoking cessation counseling reinforced  dvt/gi px  d/w primary team

## 2019-03-08 NOTE — PROGRESS NOTE ADULT - ASSESSMENT
1. CAP: Antibiotics per ID  2. COPD exacerbation: IV steroids/O2/bronchodilators per Pulmonary. REFRAIN FROM SMOKING CIGARETTES  3. PAD: Vascular surgery follow up/FRANCESCA/arterial doppler lower extremities  4. OOB to chair/ambulation as tolerated

## 2019-03-09 ENCOUNTER — TRANSCRIPTION ENCOUNTER (OUTPATIENT)
Age: 59
End: 2019-03-09

## 2019-03-09 VITALS — DIASTOLIC BLOOD PRESSURE: 86 MMHG | HEART RATE: 63 BPM | SYSTOLIC BLOOD PRESSURE: 138 MMHG

## 2019-03-09 RX ORDER — ALBUTEROL 90 UG/1
1 AEROSOL, METERED ORAL
Qty: 1 | Refills: 0 | OUTPATIENT
Start: 2019-03-09 | End: 2019-04-07

## 2019-03-09 RX ORDER — IPRATROPIUM/ALBUTEROL SULFATE 18-103MCG
3 AEROSOL WITH ADAPTER (GRAM) INHALATION
Qty: 3 | Refills: 0 | OUTPATIENT
Start: 2019-03-09 | End: 2019-04-07

## 2019-03-09 RX ORDER — BUDESONIDE AND FORMOTEROL FUMARATE DIHYDRATE 160; 4.5 UG/1; UG/1
2 AEROSOL RESPIRATORY (INHALATION)
Qty: 1 | Refills: 0 | OUTPATIENT
Start: 2019-03-09 | End: 2019-04-07

## 2019-03-09 RX ADMIN — Medication 3 MILLILITER(S): at 08:22

## 2019-03-09 RX ADMIN — Medication 600 MILLIGRAM(S): at 05:13

## 2019-03-09 RX ADMIN — Medication 2 MILLIGRAM(S): at 05:13

## 2019-03-09 RX ADMIN — GABAPENTIN 800 MILLIGRAM(S): 400 CAPSULE ORAL at 05:13

## 2019-03-09 RX ADMIN — Medication 60 MILLIGRAM(S): at 05:13

## 2019-03-09 NOTE — DISCHARGE NOTE ADULT - CARE PROVIDER_API CALL
Holland Iyer)  Internal Medicine  [address] [place] [address] [place] [id number]  Phone: [phone]  Fax: [phone]  Follow Up Time:     Dominique Fulton)  Internal Medicine  [address] [place] [address]  Phone: [phone]  Fax: [phone]  Follow Up Time:

## 2019-03-09 NOTE — PROGRESS NOTE ADULT - SUBJECTIVE AND OBJECTIVE BOX
SUBJECTIVE:    Patient is a 58y old Female who presents with a chief complaint of cough, SOB (08 Mar 2019 11:16)    Currently admitted to medicine with the primary diagnosis of Pneumonia     Today is hospital day 3d. No overnight events.     PAST MEDICAL & SURGICAL HISTORY  Bipolar affective  Hepatitis C  COPD (chronic obstructive pulmonary disease)  No significant past surgical history    SOCIAL HISTORY:  Negative for smoking/alcohol/drug use.     ALLERGIES:  ampicillin (Unknown)  Lithium Carbonate (Unknown)    MEDICATIONS:  STANDING MEDICATIONS  ALBUTerol/ipratropium for Nebulization 3 milliLiter(s) Nebulizer every 6 hours  aspirin  chewable 81 milliGRAM(s) Oral daily  azithromycin   Tablet 500 milliGRAM(s) Oral daily  buDESOnide 160 MICROgram(s)/formoterol 4.5 MICROgram(s) Inhaler 2 Puff(s) Inhalation two times a day  chlorhexidine 4% Liquid 1 Application(s) Topical <User Schedule>  clonazePAM Tablet 2 milliGRAM(s) Oral two times a day  enoxaparin Injectable 40 milliGRAM(s) SubCutaneous daily  ergocalciferol 33701 Unit(s) Oral every week  escitalopram 10 milliGRAM(s) Oral daily  gabapentin 800 milliGRAM(s) Oral three times a day  guaiFENesin  milliGRAM(s) Oral every 12 hours  methylPREDNISolone sodium succinate Injectable 60 milliGRAM(s) IV Push every 12 hours    PRN MEDICATIONS    VITALS:   T(F): 97.7  HR: 70  BP: 191/88  RR: 20  SpO2: 97%    RADIOLOGY:    < from: CT Chest No Cont (03.08.19 @ 13:49) >  IMPRESSION:    Groundglass opacifications as described in a crazy paving type   distribution. While findings may be indicative of small airways disease,   other entities are certainly within thedifferential including different   types of hypersensitivity pneumonitis.     < end of copied text >      PHYSICAL EXAM:  GEN: No acute distress  LUNGS: Clear to auscultation bilaterally   HEART: S1/S2 present. RRR.   ABD: Soft, non-tender, non-distended. Bowel sounds present  EXT: NC/NC/NE/2+PP/DAWSON  NEURO: AAOX3 SUBJECTIVE:    Patient is a 58y old Female who presents with a chief complaint of cough, SOB (08 Mar 2019 11:16)    Currently admitted to medicine with the primary diagnosis of Pneumonia     Today is hospital day 3d. No overnight events. Patient feeling better off oxygen. 97% on RA    PAST MEDICAL & SURGICAL HISTORY  Bipolar affective  Hepatitis C  COPD (chronic obstructive pulmonary disease)  No significant past surgical history    SOCIAL HISTORY:  Negative for smoking/alcohol/drug use.     ALLERGIES:  ampicillin (Unknown)  Lithium Carbonate (Unknown)    MEDICATIONS:  STANDING MEDICATIONS  ALBUTerol/ipratropium for Nebulization 3 milliLiter(s) Nebulizer every 6 hours  aspirin  chewable 81 milliGRAM(s) Oral daily  azithromycin   Tablet 500 milliGRAM(s) Oral daily  buDESOnide 160 MICROgram(s)/formoterol 4.5 MICROgram(s) Inhaler 2 Puff(s) Inhalation two times a day  chlorhexidine 4% Liquid 1 Application(s) Topical <User Schedule>  clonazePAM Tablet 2 milliGRAM(s) Oral two times a day  enoxaparin Injectable 40 milliGRAM(s) SubCutaneous daily  ergocalciferol 10793 Unit(s) Oral every week  escitalopram 10 milliGRAM(s) Oral daily  gabapentin 800 milliGRAM(s) Oral three times a day  guaiFENesin  milliGRAM(s) Oral every 12 hours  methylPREDNISolone sodium succinate Injectable 60 milliGRAM(s) IV Push every 12 hours    PRN MEDICATIONS    VITALS:   T(F): 97.7  HR: 70  BP: 191/88  RR: 20  SpO2: 97%    RADIOLOGY:    < from: CT Chest No Cont (03.08.19 @ 13:49) >  IMPRESSION:    Groundglass opacifications as described in a crazy paving type   distribution. While findings may be indicative of small airways disease,   other entities are certainly within thedifferential including different   types of hypersensitivity pneumonitis.     < end of copied text >      PHYSICAL EXAM:  GEN: No acute distress  LUNGS: Wheezes improved  HEART: S1/S2 present. RRR.   ABD: Soft, non-tender, non-distended. Bowel sounds present  EXT: NC/NC/NE/2+PP/DAWSON  NEURO: AAOX3

## 2019-03-09 NOTE — DISCHARGE NOTE ADULT - PLAN OF CARE
Medical Management Follow up with pulmonary in 1 week. Complete prednisone taper. Will need to continue daily Symbicort and prn albuterol. If short of breath can use nebulizer. I provided script for nebulizer machine. Follow up with pulmonary in 1 week. Complete prednisone taper. Will need to continue daily Symbicort and prn albuterol. If short of breath can use nebulizer. I provided script for nebulizer machine. Can get 2D echo as outpatient with PMD

## 2019-03-09 NOTE — PROGRESS NOTE ADULT - ASSESSMENT
58 year lady with PMHx COPD not currently on O2, Bipolar disorder, Hep C presenting with cough, SOB, congestion x a few weeks.     #Cough, congestion, SOB 2/2 COPD exacerbation due RLL PNA. Possible gram(-) PNA.  -admit to medicine  -c/w azithromycin   -f/u cultures  - IV steroids 60mg q12  - Nebs q6hr   - symbicort daily   - Check CT Chest NC     #PVD  - difficulty with ambulation   - ASA, statin and check bilateral arterial duplex - pending  - vascular on board     #Forgetfulness, disorientation - resolved  -f/u B12, folate  -f/u CTHNC negative    #Bipolar   -stable  -c/w klonopin    Hep C  -stable    Dispo  -from home  -ambulates independently with walker  -anticipated discharge when medically stable  -f/u PT/rehab    DVT ppx  GI ppx  CHG 4% daily and PRN  OOBTC  DASH/TLC   FULL CODE 58 year lady with PMHx COPD not currently on O2, Bipolar disorder, Hep C presenting with cough, SOB, congestion x a few weeks.     #Cough, congestion, SOB 2/2 COPD exacerbation due RLL PNA. Possible gram(-) PNA.  -admit to medicine  -c/w azithromycin   -f/u cultures  - IV steroids 60mg q12  - Nebs q6hr   - symbicort daily   - Check CT Chest NC - as above  - will taper steroids and d/c as per pulmonary     #PVD  - difficulty with ambulation   - ASA, statin and check bilateral arterial duplex - pending  - vascular on board     #Forgetfulness, disorientation - resolved  -f/u B12, folate  -f/u CTHNC negative    #Bipolar   -stable  -c/w klonopin    Hep C  -stable    Dispo  -from home  -ambulates independently with walker  -anticipated discharge when medically stable  -f/u PT/rehab    DVT ppx  GI ppx  CHG 4% daily and PRN  OOBTC  DASH/TLC   FULL CODE

## 2019-03-09 NOTE — DISCHARGE NOTE ADULT - CARE PLAN
Principal Discharge DX:	COPD (chronic obstructive pulmonary disease)  Goal:	Medical Management  Assessment and plan of treatment:	Follow up with pulmonary in 1 week. Complete prednisone taper. Will need to continue daily Symbicort and prn albuterol. If short of breath can use nebulizer. I provided script for nebulizer machine. Principal Discharge DX:	COPD (chronic obstructive pulmonary disease)  Goal:	Medical Management  Assessment and plan of treatment:	Follow up with pulmonary in 1 week. Complete prednisone taper. Will need to continue daily Symbicort and prn albuterol. If short of breath can use nebulizer. I provided script for nebulizer machine. Can get 2D echo as outpatient with PMD

## 2019-03-09 NOTE — DISCHARGE NOTE ADULT - PATIENT PORTAL LINK FT
You can access the [facility], offered by [facility], by registering with the following website: [email or web address]

## 2019-03-09 NOTE — PROGRESS NOTE ADULT - ASSESSMENT
1. COPD exacerbation: IV steroids/transition to PO and taper per Pulmonary. Bronchodilators per Pulmonary  2. URI: No pneumonia on CT chest. Complete 5 days of azithromycin  3. PAD: Vascular follow up. Arterial doppler lower extremities  4. QUARLES: Smoking cessation, BNP, 2DECHO 1. COPD exacerbation: PO steroids/taper per Pulmonary. Symbicort per Pulmonary. Stable for D/C home. Follow up in office in 1 week [phone]  2. URI: No pneumonia on CT chest. Complete 5 days of azithromycin  3. PAD: Vascular follow up. Arterial doppler lower extremities  4. QUARLES: Smoking cessation, 2DECHO as outpatient

## 2019-03-09 NOTE — DISCHARGE NOTE ADULT - MEDICATION SUMMARY - MEDICATIONS TO TAKE
I will START or STAY ON the medications listed below when I get home from the hospital:    predniSONE 10 mg oral tablet  -- 6 tabs daily for 3 days, 4 tabs daily for 3 days, 2 tabs daily for 3 days, 1 tab daily for 3 days.   -- It is very important that you take or use this exactly as directed.  Do not skip doses or discontinue unless directed by your doctor.  Obtain medical advice before taking any non-prescription drugs as some may affect the action of this medication.  Take with food or milk.    -- Indication: For COPD (chronic obstructive pulmonary disease)    CLONAZEPAM 2 MG TABLET  -- Indication: For anxiety    GABAPENTIN 800 MG TABLET  -- Indication: For Neuropathy    ESCITALOPRAM 10 MG TABLET  -- Indication: For Bipolar affective    ALPRAZOLAM 1 MG TABLET  -- Indication: For Anxiety    DOXEPIN 50 MG CAPSULE  -- Indication: For Neuropathy     ipratropium-albuterol 0.5 mg-2.5 mg/3 mLinhalation solution  -- 3 milliliter(s) inhaled every 6 hours  -- Indication: For COPD (chronic obstructive pulmonary disease)    budesonide-formoterol 160 mcg-4.5 mcg/inh inhalation aerosol  -- 2 puff(s) inhaled 2 times a day   -- Indication: For COPD (chronic obstructive pulmonary disease)    Ventolin HFA 90 mcg/inh inhalation aerosol  -- 1 puff(s) inhaled 4 times a day, As Needed -for bronchospasm   -- For inhalation only.  It is very important that you take or use this exactly as directed.  Do not skip doses or discontinue unless directed by your doctor.  Obtain medical advice before taking any non-prescription drugs as some may affect the action of this medication.  Shake well before use.    -- Indication: For COPD (chronic obstructive pulmonary disease)    VITAMIN D2 1.25MG(50,000 UNIT)  -- Indication: For Suppplement

## 2019-03-09 NOTE — PROGRESS NOTE ADULT - SUBJECTIVE AND OBJECTIVE BOX
ANASTACIO SIMENTAL  58y  Female      Patient is a 58y old  Female who presents with a chief complaint of cough, SOB (09 Mar 2019 09:28)      INTERVAL HPI/OVERNIGHT EVENTS: Feeling better      REVIEW OF SYSTEMS:  CONSTITUTIONAL: No fever, weight loss, or fatigue  EYES: No eye pain, visual disturbances, or discharge  ENMT:  No difficulty hearing, tinnitus, vertigo; No sinus or throat pain  NECK: No pain or stiffness  BREASTS: No pain, masses, or nipple discharge  RESPIRATORY: Productive cough, wheezing, no chills or hemoptysis; QUARLES  CARDIOVASCULAR: No chest pain, palpitations, dizziness, or leg swelling  GASTROINTESTINAL: No abdominal or epigastric pain. No nausea, vomiting, or hematemesis; No diarrhea or constipation. No melena or hematochezia.  GENITOURINARY: No dysuria, frequency, hematuria, or incontinence  NEUROLOGICAL: No headaches, memory loss, loss of strength, numbness, or tremors  SKIN: No itching, burning, rashes, or lesions   LYMPH NODES: No enlarged glands  ENDOCRINE: No heat or cold intolerance; No hair loss  MUSCULOSKELETAL: No joint pain or swelling; Lower extremity weakness and pain  PSYCHIATRIC: No depression, anxiety, mood swings, or difficulty sleeping  HEME/LYMPH: No easy bruising, or bleeding gums  ALLERY AND IMMUNOLOGIC: No hives or eczema    T(C): 36.5 (03-09-19 @ 07:30), Max: 36.5 (03-09-19 @ 07:30)  HR: 63 (03-09-19 @ 08:20) (63 - 70)  BP: 138/86 (03-09-19 @ 08:20) (127/76 - 191/88)  RR: 20 (03-09-19 @ 07:30) (18 - 20)  SpO2: 97% (03-09-19 @ 07:30) (96% - 99%)  Wt(kg): --Vital Signs Last 24 Hrs  T(C): 36.5 (09 Mar 2019 07:30), Max: 36.5 (09 Mar 2019 07:30)  T(F): 97.7 (09 Mar 2019 07:30), Max: 97.7 (09 Mar 2019 07:30)  HR: 63 (09 Mar 2019 08:20) (63 - 70)  BP: 138/86 (09 Mar 2019 08:20) (127/76 - 191/88)  BP(mean): --  RR: 20 (09 Mar 2019 07:30) (18 - 20)  SpO2: 97% (09 Mar 2019 07:30) (96% - 99%)    PHYSICAL EXAM:  GENERAL: NAD, well-groomed, well-developed  HEAD:  Atraumatic, Normocephalic  EYES: EOMI, PERRLA, conjunctiva and sclera clear  ENMT: No tonsillar erythema, exudates, or enlargement; Moist mucous membranes, Good dentition, No lesions  NECK: Supple, No JVD, Normal thyroid  NERVOUS SYSTEM:  Alert & Oriented X3, Good concentration; Motor Strength 5/5 B/L upper and lower extremities; DTRs 2+ intact and symmetric  CHEST/LUNG: Scattered rhonchi, wheezing  HEART: Regular rate and rhythm; No murmurs, rubs, or gallops  ABDOMEN: Soft, Nontender, Nondistended; Bowel sounds present  EXTREMITIES:  Poor distal lower extremity Peripheral Pulses, No clubbing, cyanosis, or edema  LYMPH: No lymphadenopathy noted  SKIN: No rashes or lesions    Consultant(s) Notes Reviewed:  [x ] YES  [ ] NO    Discussed with Consultants/Other Providers [ x] YES     LABS         RADIOLOGY & ADDITIONAL TESTS:    Imaging Personally Reviewed:  [ ] YES  [ ] NO    HEALTH ISSUES - PROBLEM Dx:

## 2019-03-09 NOTE — DISCHARGE NOTE ADULT - HOSPITAL COURSE
58 year lady with PMHx COPD not currently on O2, Bipolar disorder, Hep C presenting with cough, SOB, congestion x a few weeks. Admitted for copd exacebration. Started on IV solumedrol with improvement in SOB. Off supplmental O2. C/w prednisone taper on discharge to f/u with PMD and pulmonary in 1 week.

## 2019-03-09 NOTE — PROGRESS NOTE ADULT - ASSESSMENT
Impression:  Acute respiratory failure  Copd exacerbation  Suspected pneumonia  Tobacco abuse  R/o CHF    Recommendations:  Continue O2 if needed to keep sat > 90%  Continue IV steroids and frequent bronchodilator treatments  Continue antibiotics  Smoking cessation  Follow up BNP  Consider cardiology evalaution

## 2019-03-09 NOTE — PROGRESS NOTE ADULT - SUBJECTIVE AND OBJECTIVE BOX
Interval history and ROS:    Still with cough and wheeze but wants to leave    MEDICATIONS:  ALBUTerol/ipratropium for Nebulization 3 milliLiter(s) Nebulizer every 6 hours  aspirin  chewable 81 milliGRAM(s) Oral daily  azithromycin   Tablet 500 milliGRAM(s) Oral daily  buDESOnide 160 MICROgram(s)/formoterol 4.5 MICROgram(s) Inhaler 2 Puff(s) Inhalation two times a day  chlorhexidine 4% Liquid 1 Application(s) Topical <User Schedule>  clonazePAM Tablet 2 milliGRAM(s) Oral two times a day  enoxaparin Injectable 40 milliGRAM(s) SubCutaneous daily  ergocalciferol 89468 Unit(s) Oral every week  escitalopram 10 milliGRAM(s) Oral daily  gabapentin 800 milliGRAM(s) Oral three times a day  guaiFENesin  milliGRAM(s) Oral every 12 hours  methylPREDNISolone sodium succinate Injectable 60 milliGRAM(s) IV Push every 12 hours      VITAL SIGNS:  Vital Signs Last 24 Hrs  T(C): 36.5 (09 Mar 2019 07:30), Max: 36.5 (09 Mar 2019 07:30)  T(F): 97.7 (09 Mar 2019 07:30), Max: 97.7 (09 Mar 2019 07:30)  HR: 63 (09 Mar 2019 08:20) (63 - 70)  BP: 138/86 (09 Mar 2019 08:20) (127/76 - 191/88)  BP(mean): --  RR: 20 (09 Mar 2019 07:30) (18 - 20)  SpO2: 97% (09 Mar 2019 07:30) (96% - 99%)        PHYSICAL EXAM:  Awake and alert NAD  Neck supple, no LN  S1 and S2 no murmur  Lungs bilateral wheeze  Abdomen is soft and non tender  There is no edema  Neurologically non focal      < from: CT Chest No Cont (03.08.19 @ 13:49) >  Groundglass opacifications as described in a crazy paving type   distribution. While findings may be indicative of small airways disease,   other entities are certainly within the differential including different   types of hypersensitivity pneumonitis.

## 2019-03-11 LAB
CULTURE RESULTS: SIGNIFICANT CHANGE UP
CULTURE RESULTS: SIGNIFICANT CHANGE UP
SPECIMEN SOURCE: SIGNIFICANT CHANGE UP
SPECIMEN SOURCE: SIGNIFICANT CHANGE UP

## 2019-03-13 DIAGNOSIS — F17.210 NICOTINE DEPENDENCE, CIGARETTES, UNCOMPLICATED: ICD-10-CM

## 2019-03-13 DIAGNOSIS — I50.9 HEART FAILURE, UNSPECIFIED: ICD-10-CM

## 2019-03-13 DIAGNOSIS — J96.90 RESPIRATORY FAILURE, UNSPECIFIED, UNSPECIFIED WHETHER WITH HYPOXIA OR HYPERCAPNIA: ICD-10-CM

## 2019-03-13 DIAGNOSIS — R06.02 SHORTNESS OF BREATH: ICD-10-CM

## 2019-03-13 DIAGNOSIS — R41.0 DISORIENTATION, UNSPECIFIED: ICD-10-CM

## 2019-03-13 DIAGNOSIS — J44.1 CHRONIC OBSTRUCTIVE PULMONARY DISEASE WITH (ACUTE) EXACERBATION: ICD-10-CM

## 2019-03-13 DIAGNOSIS — Z86.19 PERSONAL HISTORY OF OTHER INFECTIOUS AND PARASITIC DISEASES: ICD-10-CM

## 2019-03-13 DIAGNOSIS — F60.9 PERSONALITY DISORDER, UNSPECIFIED: ICD-10-CM

## 2019-03-13 DIAGNOSIS — Z28.21 IMMUNIZATION NOT CARRIED OUT BECAUSE OF PATIENT REFUSAL: ICD-10-CM

## 2019-03-13 DIAGNOSIS — J06.9 ACUTE UPPER RESPIRATORY INFECTION, UNSPECIFIED: ICD-10-CM

## 2019-03-13 DIAGNOSIS — Z88.1 ALLERGY STATUS TO OTHER ANTIBIOTIC AGENTS STATUS: ICD-10-CM

## 2019-03-13 DIAGNOSIS — I73.9 PERIPHERAL VASCULAR DISEASE, UNSPECIFIED: ICD-10-CM

## 2019-03-13 DIAGNOSIS — F31.9 BIPOLAR DISORDER, UNSPECIFIED: ICD-10-CM

## 2019-03-13 DIAGNOSIS — Z87.898 PERSONAL HISTORY OF OTHER SPECIFIED CONDITIONS: ICD-10-CM

## 2019-03-30 ENCOUNTER — INPATIENT (INPATIENT)
Facility: HOSPITAL | Age: 59
LOS: 3 days | Discharge: HOME | End: 2019-04-03
Attending: INTERNAL MEDICINE | Admitting: INTERNAL MEDICINE
Payer: MEDICARE

## 2019-03-30 VITALS
SYSTOLIC BLOOD PRESSURE: 124 MMHG | DIASTOLIC BLOOD PRESSURE: 77 MMHG | HEART RATE: 118 BPM | TEMPERATURE: 99 F | RESPIRATION RATE: 18 BRPM | OXYGEN SATURATION: 92 %

## 2019-03-30 PROBLEM — B19.20 UNSPECIFIED VIRAL HEPATITIS C WITHOUT HEPATIC COMA: Chronic | Status: ACTIVE | Noted: 2019-03-06

## 2019-03-30 PROBLEM — F31.9 BIPOLAR DISORDER, UNSPECIFIED: Chronic | Status: ACTIVE | Noted: 2019-03-06

## 2019-03-30 PROBLEM — J44.9 CHRONIC OBSTRUCTIVE PULMONARY DISEASE, UNSPECIFIED: Chronic | Status: ACTIVE | Noted: 2019-03-06

## 2019-03-30 LAB
ALBUMIN SERPL ELPH-MCNC: 3.7 G/DL — SIGNIFICANT CHANGE UP (ref 3.5–5.2)
ALP SERPL-CCNC: 228 U/L — HIGH (ref 30–115)
ALT FLD-CCNC: 32 U/L — SIGNIFICANT CHANGE UP (ref 0–41)
ANION GAP SERPL CALC-SCNC: 14 MMOL/L — SIGNIFICANT CHANGE UP (ref 7–14)
AST SERPL-CCNC: 38 U/L — SIGNIFICANT CHANGE UP (ref 0–41)
BASE EXCESS BLDV CALC-SCNC: 2.9 MMOL/L — HIGH (ref -2–2)
BASOPHILS # BLD AUTO: 0 K/UL — SIGNIFICANT CHANGE UP (ref 0–0.2)
BASOPHILS NFR BLD AUTO: 0 % — SIGNIFICANT CHANGE UP (ref 0–1)
BILIRUB SERPL-MCNC: 0.7 MG/DL — SIGNIFICANT CHANGE UP (ref 0.2–1.2)
BUN SERPL-MCNC: 19 MG/DL — SIGNIFICANT CHANGE UP (ref 10–20)
CA-I SERPL-SCNC: 1.21 MMOL/L — SIGNIFICANT CHANGE UP (ref 1.12–1.3)
CALCIUM SERPL-MCNC: 9.3 MG/DL — SIGNIFICANT CHANGE UP (ref 8.5–10.1)
CHLORIDE SERPL-SCNC: 101 MMOL/L — SIGNIFICANT CHANGE UP (ref 98–110)
CO2 SERPL-SCNC: 21 MMOL/L — SIGNIFICANT CHANGE UP (ref 17–32)
CREAT SERPL-MCNC: 0.9 MG/DL — SIGNIFICANT CHANGE UP (ref 0.7–1.5)
EOSINOPHIL # BLD AUTO: 0 K/UL — SIGNIFICANT CHANGE UP (ref 0–0.7)
EOSINOPHIL NFR BLD AUTO: 0 % — SIGNIFICANT CHANGE UP (ref 0–8)
FLU A RESULT: NEGATIVE — SIGNIFICANT CHANGE UP
FLU A RESULT: NEGATIVE — SIGNIFICANT CHANGE UP
FLUAV AG NPH QL: NEGATIVE — SIGNIFICANT CHANGE UP
FLUBV AG NPH QL: NEGATIVE — SIGNIFICANT CHANGE UP
GAS PNL BLDV: 137 MMOL/L — SIGNIFICANT CHANGE UP (ref 136–145)
GAS PNL BLDV: SIGNIFICANT CHANGE UP
GLUCOSE SERPL-MCNC: 100 MG/DL — HIGH (ref 70–99)
HCO3 BLDV-SCNC: 28 MMOL/L — SIGNIFICANT CHANGE UP (ref 22–29)
HCT VFR BLD CALC: 46.6 % — SIGNIFICANT CHANGE UP (ref 37–47)
HCT VFR BLDA CALC: 50.2 % — HIGH (ref 34–44)
HGB BLD CALC-MCNC: 16.4 G/DL — SIGNIFICANT CHANGE UP (ref 14–18)
HGB BLD-MCNC: 15.9 G/DL — SIGNIFICANT CHANGE UP (ref 12–16)
IMM GRANULOCYTES NFR BLD AUTO: 0.4 % — HIGH (ref 0.1–0.3)
LACTATE BLDV-MCNC: 0.9 MMOL/L — SIGNIFICANT CHANGE UP (ref 0.5–1.6)
LYMPHOCYTES # BLD AUTO: 1.46 K/UL — SIGNIFICANT CHANGE UP (ref 1.2–3.4)
LYMPHOCYTES # BLD AUTO: 20.2 % — LOW (ref 20.5–51.1)
MAGNESIUM SERPL-MCNC: 1.7 MG/DL — LOW (ref 1.8–2.4)
MCHC RBC-ENTMCNC: 32.8 PG — HIGH (ref 27–31)
MCHC RBC-ENTMCNC: 34.1 G/DL — SIGNIFICANT CHANGE UP (ref 32–37)
MCV RBC AUTO: 96.1 FL — SIGNIFICANT CHANGE UP (ref 81–99)
MONOCYTES # BLD AUTO: 0.63 K/UL — HIGH (ref 0.1–0.6)
MONOCYTES NFR BLD AUTO: 8.7 % — SIGNIFICANT CHANGE UP (ref 1.7–9.3)
NEUTROPHILS # BLD AUTO: 5.09 K/UL — SIGNIFICANT CHANGE UP (ref 1.4–6.5)
NEUTROPHILS NFR BLD AUTO: 70.7 % — SIGNIFICANT CHANGE UP (ref 42.2–75.2)
NRBC # BLD: 0 /100 WBCS — SIGNIFICANT CHANGE UP (ref 0–0)
PCO2 BLDV: 43 MMHG — SIGNIFICANT CHANGE UP (ref 41–51)
PH BLDV: 7.42 — SIGNIFICANT CHANGE UP (ref 7.26–7.43)
PLATELET # BLD AUTO: 206 K/UL — SIGNIFICANT CHANGE UP (ref 130–400)
PO2 BLDV: 81 MMHG — HIGH (ref 20–40)
POTASSIUM BLDV-SCNC: 4.4 MMOL/L — SIGNIFICANT CHANGE UP (ref 3.3–5.6)
POTASSIUM SERPL-MCNC: 5.2 MMOL/L — HIGH (ref 3.5–5)
POTASSIUM SERPL-SCNC: 5.2 MMOL/L — HIGH (ref 3.5–5)
PROT SERPL-MCNC: 7.1 G/DL — SIGNIFICANT CHANGE UP (ref 6–8)
RBC # BLD: 4.85 M/UL — SIGNIFICANT CHANGE UP (ref 4.2–5.4)
RBC # FLD: 14.6 % — HIGH (ref 11.5–14.5)
RSV RESULT: NEGATIVE — SIGNIFICANT CHANGE UP
RSV RNA RESP QL NAA+PROBE: NEGATIVE — SIGNIFICANT CHANGE UP
SAO2 % BLDV: 97 % — SIGNIFICANT CHANGE UP
SODIUM SERPL-SCNC: 136 MMOL/L — SIGNIFICANT CHANGE UP (ref 135–146)
WBC # BLD: 7.21 K/UL — SIGNIFICANT CHANGE UP (ref 4.8–10.8)
WBC # FLD AUTO: 7.21 K/UL — SIGNIFICANT CHANGE UP (ref 4.8–10.8)

## 2019-03-30 RX ORDER — GABAPENTIN 400 MG/1
800 CAPSULE ORAL THREE TIMES A DAY
Qty: 0 | Refills: 0 | Status: DISCONTINUED | OUTPATIENT
Start: 2019-03-30 | End: 2019-04-03

## 2019-03-30 RX ORDER — CLONAZEPAM 1 MG
2 TABLET ORAL
Qty: 0 | Refills: 0 | Status: DISCONTINUED | OUTPATIENT
Start: 2019-03-30 | End: 2019-04-01

## 2019-03-30 RX ORDER — IPRATROPIUM/ALBUTEROL SULFATE 18-103MCG
3 AEROSOL WITH ADAPTER (GRAM) INHALATION
Qty: 0 | Refills: 0 | Status: DISCONTINUED | OUTPATIENT
Start: 2019-03-30 | End: 2019-03-31

## 2019-03-30 RX ORDER — AZITHROMYCIN 500 MG/1
500 TABLET, FILM COATED ORAL ONCE
Qty: 0 | Refills: 0 | Status: COMPLETED | OUTPATIENT
Start: 2019-03-30 | End: 2019-03-30

## 2019-03-30 RX ORDER — CEFEPIME 1 G/1
2000 INJECTION, POWDER, FOR SOLUTION INTRAMUSCULAR; INTRAVENOUS EVERY 8 HOURS
Qty: 0 | Refills: 0 | Status: DISCONTINUED | OUTPATIENT
Start: 2019-03-31 | End: 2019-04-02

## 2019-03-30 RX ORDER — HEPARIN SODIUM 5000 [USP'U]/ML
5000 INJECTION INTRAVENOUS; SUBCUTANEOUS EVERY 8 HOURS
Qty: 0 | Refills: 0 | Status: DISCONTINUED | OUTPATIENT
Start: 2019-03-30 | End: 2019-04-03

## 2019-03-30 RX ORDER — IPRATROPIUM/ALBUTEROL SULFATE 18-103MCG
3 AEROSOL WITH ADAPTER (GRAM) INHALATION ONCE
Qty: 0 | Refills: 0 | Status: COMPLETED | OUTPATIENT
Start: 2019-03-30 | End: 2019-03-30

## 2019-03-30 RX ORDER — CEFTRIAXONE 500 MG/1
1 INJECTION, POWDER, FOR SOLUTION INTRAMUSCULAR; INTRAVENOUS ONCE
Qty: 0 | Refills: 0 | Status: COMPLETED | OUTPATIENT
Start: 2019-03-30 | End: 2019-03-30

## 2019-03-30 RX ORDER — CEFEPIME 1 G/1
INJECTION, POWDER, FOR SOLUTION INTRAMUSCULAR; INTRAVENOUS
Qty: 0 | Refills: 0 | Status: DISCONTINUED | OUTPATIENT
Start: 2019-03-30 | End: 2019-04-02

## 2019-03-30 RX ORDER — VANCOMYCIN HCL 1 G
1000 VIAL (EA) INTRAVENOUS EVERY 12 HOURS
Qty: 0 | Refills: 0 | Status: DISCONTINUED | OUTPATIENT
Start: 2019-03-30 | End: 2019-04-02

## 2019-03-30 RX ORDER — BUDESONIDE AND FORMOTEROL FUMARATE DIHYDRATE 160; 4.5 UG/1; UG/1
2 AEROSOL RESPIRATORY (INHALATION)
Qty: 0 | Refills: 0 | Status: DISCONTINUED | OUTPATIENT
Start: 2019-03-30 | End: 2019-04-03

## 2019-03-30 RX ORDER — ALPRAZOLAM 0.25 MG
1 TABLET ORAL DAILY
Qty: 0 | Refills: 0 | Status: DISCONTINUED | OUTPATIENT
Start: 2019-03-30 | End: 2019-03-31

## 2019-03-30 RX ORDER — CEFEPIME 1 G/1
2000 INJECTION, POWDER, FOR SOLUTION INTRAMUSCULAR; INTRAVENOUS ONCE
Qty: 0 | Refills: 0 | Status: COMPLETED | OUTPATIENT
Start: 2019-03-30 | End: 2019-03-30

## 2019-03-30 RX ADMIN — Medication 250 MILLIGRAM(S): at 20:39

## 2019-03-30 RX ADMIN — Medication 3 MILLILITER(S): at 12:36

## 2019-03-30 RX ADMIN — GABAPENTIN 800 MILLIGRAM(S): 400 CAPSULE ORAL at 20:41

## 2019-03-30 RX ADMIN — CEFEPIME 100 MILLIGRAM(S): 1 INJECTION, POWDER, FOR SOLUTION INTRAMUSCULAR; INTRAVENOUS at 20:40

## 2019-03-30 RX ADMIN — Medication 125 MILLIGRAM(S): at 12:36

## 2019-03-30 RX ADMIN — CEFTRIAXONE 100 GRAM(S): 500 INJECTION, POWDER, FOR SOLUTION INTRAMUSCULAR; INTRAVENOUS at 12:35

## 2019-03-30 RX ADMIN — AZITHROMYCIN 255 MILLIGRAM(S): 500 TABLET, FILM COATED ORAL at 13:16

## 2019-03-30 RX ADMIN — Medication 3 MILLILITER(S): at 14:32

## 2019-03-30 RX ADMIN — Medication 2 MILLIGRAM(S): at 20:39

## 2019-03-30 RX ADMIN — Medication 3 MILLILITER(S): at 12:54

## 2019-03-30 NOTE — ED ADULT NURSE NOTE - PMH
Asthma    Bipolar affective    COPD (chronic obstructive pulmonary disease)    Hepatitis C    PVD (peripheral vascular disease)

## 2019-03-30 NOTE — ED PROVIDER NOTE - NS ED ROS FT
· Constitutional [-]: +chills, +fever, no night sweats, no weight loss  · CARDIOVASCULAR: normal rate and rhythm, no chest pain and no edema.  · RESPIRATORY: no chest pain, +cough, and +shortness of breath.  · GASTROINTESTINAL: no abdominal pain, no bloating, no constipation, no diarrhea, no nausea and no vomiting.  · ROS STATEMENT: all other ROS negative except as per HPI

## 2019-03-30 NOTE — ED PROVIDER NOTE - PHYSICAL EXAMINATION
· CONSTITUTIONAL: Well appearing, well nourished, awake, alert, oriented to person, place, time/situation and in mild resp distress. Low pulse ox and tachycardia (normal pulse ox for pt 99% as per pt)  · CARDIAC: Normal rate, regular rhythm.  Heart sounds S1, S2.  No murmurs, rubs or gallops.  · RESPIRATORY: +B/L exp wheezing and rales  · GASTROINTESTINAL: Abdomen soft, non-tender, no guarding.  · SKIN: Skin normal color for race, warm, dry and intact. No evidence of rash.   Ext: No pedal edema

## 2019-03-30 NOTE — H&P ADULT - ATTENDING COMMENTS
Patient seen and examined at bedside. Agree with above. ID consult Dr. Soto. Pulmonary consult Dr. Fulton. Blood/sputum cultures and sensitivity. IV antibiotics per ID. O2/bronchodilatyors/IV steroids per Pulmonary. Prognosis guarded

## 2019-03-30 NOTE — H&P ADULT - NSICDXPASTMEDICALHX_GEN_ALL_CORE_FT
PAST MEDICAL HISTORY:  Asthma     Bipolar affective     COPD (chronic obstructive pulmonary disease)     Hepatitis C     PVD (peripheral vascular disease)

## 2019-03-30 NOTE — ED PROVIDER NOTE - PROGRESS NOTE DETAILS
Spoke with  who is agreeable to admit pt under his service. Recommends ID consult, , and pulmonology consult, .

## 2019-03-30 NOTE — H&P ADULT - HISTORY OF PRESENT ILLNESS
58 Year Old Female PMH of COPD, not currently on home oxygen, PVD, s/p stent, Hep C, Bipolar Disorder, current smoker presents with shortness of breath and cough. Pt states that she was admitted to the hospital on 3/6 and d/c'ed home on 3/9. Pt was admitted for copd exacerbation and pneumonia (noted with RML infiltrate on initial cxr). Pt was treated with prednisone/solumedrol and abx as inpatient and was d/c'ed home with prednisone. She completed course of prednisone but continues to have productive cough, wheezing, dyspnea with exertion and intermittent fever. Pt states that she had a fever last night, [id number]. Pt states that she has been using Symbicort albuterol inhaler (4-5x per day) and nebulizer (2x per day), with no improvement of sx. Pt was not d/c'ed home with abx and has not followed with pulmonology since d/c. She denies any ha, neck pain, chest pain, abdominal pain, n/v/d, leg swelling. No recent travel or sick contacts. Pt did not get flu shot this year but did get pneumonia vaccine. Pt is still smoking 1/2 ppd. She has been intubated in the past, last time was 6 years ago.

## 2019-03-30 NOTE — ED PROVIDER NOTE - CARE PLAN
Principal Discharge DX:	Chronic obstructive pulmonary disease with acute exacerbation  Secondary Diagnosis:	Pneumonia of right middle lobe due to infectious organism

## 2019-03-30 NOTE — H&P ADULT - ASSESSMENT
58 Year Old Female PMH of COPD, not currently on home oxygen, PVD, s/p stent, Hep C, Bipolar Disorder, current smoker presents with shortness of breath and cough. Found to have Pneumonia on CXR.       1) SOB likely secondary to Pneumonia With Superimposed COPD Exacerbation   Follow with Pulmonary Dr. Fulton   Continue Symbicort and Albuterol   Start Cefepime and Vanc   De-escalate antibiotics once patient stable   Solu-Medrol 80 every 8 hours.     2) Neuropathic Pain   Continue with home dose of Gabapentin 80 three times daily.     3) Bipolar Disorder   Patient to bring in her Home med as it is not found in our formulary.     4) DVT Prophylaxis   Heparin Sub q     5) Disposition   Patient being admitted to medicine   Full Code   Continue antibiotics and monitor carefully for decompensation and need for intubation   Patient appears stable at this point.

## 2019-03-30 NOTE — ED PROVIDER NOTE - OBJECTIVE STATEMENT
59 yo F with h/o copd/asthma, not currently on home oxygen, PVD, s/p stent, Hep C, bipolar d/o, current smoker p/w shortness of breath and cough. Pt states that she was admitted to the hospital on 3/6 and d/c'ed home on 3/9. Pt was admitted for copd exacerbation and pneumonia (noted with RML infiltrate on initial cxr). Pt was treated with prednisone/solumedrol and abx as inpatient and was d/c'ed home with prednisone. Pt states that she completed course of prednisone but continues to have productive cough, wheezing, dyspnea with exertion and intermittent fever. Pt states that she had a fever last night, [id number]. Pt states that she has been using symbicort, albuterol inhaler (4-5x per day) and nebulizer (2x per day), with no improvement of sx. Pt was not d/c'ed home with abx. Pt has not f/u with pulmonology since d/c. Pt denies any ha, neck pain, chest pain, abdominal pain, n/v/d, leg swelling. No recent travel or sick contacts. Pt did not get flu shot this year but did get pneumonia vaccine. Pt is still smoking 1/2 ppd. Pt has been intubated in the past, last time was 6 years ago.

## 2019-03-30 NOTE — H&P ADULT - NSHPPHYSICALEXAM_GEN_ALL_CORE
PHYSICAL EXAM:  GENERAL: NAD, speaks in full sentences, no signs of respiratory distress  HEAD:  Atraumatic, Normocephalic  EYES: EOMI, PERRLA, conjunctiva and sclera clear  NECK: Supple, No JVD  CHEST/LUNG: Bilateral air entry. Wheezing appreciated on auscultation BILATERALLY R>L   HEART: Regular rate and rhythm; No murmurs;   ABDOMEN: Soft, Nontender, Nondistended; Bowel sounds present; No guarding  EXTREMITIES:  2+ Peripheral Pulses, No cyanosis or edema  PSYCH: AAOx3  NEUROLOGY: non-focal

## 2019-03-30 NOTE — ED PROVIDER NOTE - CLINICAL SUMMARY MEDICAL DECISION MAKING FREE TEXT BOX
57 yo F with h/o copd/asthma, not currently on home oxygen, PVD, s/p stent, Hep C, bipolar d/o, current smoker p/w shortness of breath and cough. Pt states that she was admitted to the hospital on 3/6 and d/c'ed home on 3/9. Pt with no improvement of sx. Pt noted with low pulse ox on arrival to ED. with diffiuse wheezing. Improved after nebs/solumedrol/abx. Pt noted with persistent RML infiltrate. Pt admitted for furher management

## 2019-03-30 NOTE — PATIENT PROFILE ADULT - DO YOU FEEL UNSAFE AT HOME, WORK, OR SCHOOL?
Telephone Encounter by Isha Sheehan CMA at 08/13/18 11:49 AM     Author:  Isha Sheehan CMA Service:  (none) Author Type:  Certified Medical Assistant     Filed:  08/13/18 11:50 AM Encounter Date:  8/12/2018 Status:  Signed     :  Isha Sheehan CMA (Certified Medical Assistant)            Please see previous my chart request.  MESSAGE routed to Dr. Bruno Bui[DR1.1M]   Electronically Signed by:    Isha Sheehan CMA , 8/13/2018[DR1.1T]       Revision History        User Key Date/Time User Provider Type Action    > DR1.1 08/13/18 11:50 AM Isha Sheehan CMA Certified Medical Assistant Sign    M - Manual, T - Template             no

## 2019-03-30 NOTE — H&P ADULT - NSHPLABSRESULTS_GEN_ALL_CORE
15.9   7.21  )-----------( 206      ( 30 Mar 2019 12:30 )             46.6     03-30    136  |  101  |  19  ----------------------------<  100<H>  5.2<H>   |  21  |  0.9    Ca    9.3      30 Mar 2019 12:30  Mg     1.7     03-30    TPro  7.1  /  Alb  3.7  /  TBili  0.7  /  DBili  x   /  AST  38  /  ALT  32  /  AlkPhos  228<H>  03-30    < from: Xray Chest 2 Views PA/Lat (03.30.19 @ 14:33) >    Impression:      Bibasilar opacities, concerning for pneumonia. No pleural effusion or   pneumothorax.        < end of copied text >

## 2019-03-31 RX ORDER — OXYCODONE AND ACETAMINOPHEN 5; 325 MG/1; MG/1
1 TABLET ORAL ONCE
Qty: 0 | Refills: 0 | Status: DISCONTINUED | OUTPATIENT
Start: 2019-03-31 | End: 2019-03-31

## 2019-03-31 RX ORDER — SIMETHICONE 80 MG/1
80 TABLET, CHEWABLE ORAL EVERY 8 HOURS
Qty: 0 | Refills: 0 | Status: DISCONTINUED | OUTPATIENT
Start: 2019-03-31 | End: 2019-04-03

## 2019-03-31 RX ORDER — ALPRAZOLAM 0.25 MG
1 TABLET ORAL ONCE
Qty: 0 | Refills: 0 | Status: DISCONTINUED | OUTPATIENT
Start: 2019-03-31 | End: 2019-03-31

## 2019-03-31 RX ORDER — ALPRAZOLAM 0.25 MG
1 TABLET ORAL AT BEDTIME
Qty: 0 | Refills: 0 | Status: DISCONTINUED | OUTPATIENT
Start: 2019-03-31 | End: 2019-04-01

## 2019-03-31 RX ORDER — MAGNESIUM SULFATE 500 MG/ML
1 VIAL (ML) INJECTION ONCE
Qty: 0 | Refills: 0 | Status: COMPLETED | OUTPATIENT
Start: 2019-03-31 | End: 2019-03-31

## 2019-03-31 RX ORDER — CHLORHEXIDINE GLUCONATE 213 G/1000ML
1 SOLUTION TOPICAL
Qty: 0 | Refills: 0 | Status: DISCONTINUED | OUTPATIENT
Start: 2019-03-31 | End: 2019-04-03

## 2019-03-31 RX ORDER — ACETAMINOPHEN 500 MG
325 TABLET ORAL ONCE
Qty: 0 | Refills: 0 | Status: COMPLETED | OUTPATIENT
Start: 2019-03-31 | End: 2019-03-31

## 2019-03-31 RX ORDER — IPRATROPIUM/ALBUTEROL SULFATE 18-103MCG
3 AEROSOL WITH ADAPTER (GRAM) INHALATION EVERY 6 HOURS
Qty: 0 | Refills: 0 | Status: DISCONTINUED | OUTPATIENT
Start: 2019-03-31 | End: 2019-04-03

## 2019-03-31 RX ORDER — ACETAMINOPHEN 500 MG
650 TABLET ORAL EVERY 6 HOURS
Qty: 0 | Refills: 0 | Status: DISCONTINUED | OUTPATIENT
Start: 2019-03-31 | End: 2019-04-03

## 2019-03-31 RX ADMIN — Medication 325 MILLIGRAM(S): at 01:05

## 2019-03-31 RX ADMIN — GABAPENTIN 800 MILLIGRAM(S): 400 CAPSULE ORAL at 06:10

## 2019-03-31 RX ADMIN — Medication 250 MILLIGRAM(S): at 17:35

## 2019-03-31 RX ADMIN — Medication 2 MILLIGRAM(S): at 06:10

## 2019-03-31 RX ADMIN — Medication 250 MILLIGRAM(S): at 06:11

## 2019-03-31 RX ADMIN — CEFEPIME 100 MILLIGRAM(S): 1 INJECTION, POWDER, FOR SOLUTION INTRAMUSCULAR; INTRAVENOUS at 21:39

## 2019-03-31 RX ADMIN — CEFEPIME 100 MILLIGRAM(S): 1 INJECTION, POWDER, FOR SOLUTION INTRAMUSCULAR; INTRAVENOUS at 06:11

## 2019-03-31 RX ADMIN — Medication 3 MILLILITER(S): at 09:19

## 2019-03-31 RX ADMIN — GABAPENTIN 800 MILLIGRAM(S): 400 CAPSULE ORAL at 13:21

## 2019-03-31 RX ADMIN — Medication 325 MILLIGRAM(S): at 01:26

## 2019-03-31 RX ADMIN — Medication 1 MILLIGRAM(S): at 01:06

## 2019-03-31 RX ADMIN — OXYCODONE AND ACETAMINOPHEN 1 TABLET(S): 5; 325 TABLET ORAL at 21:35

## 2019-03-31 RX ADMIN — CEFEPIME 100 MILLIGRAM(S): 1 INJECTION, POWDER, FOR SOLUTION INTRAMUSCULAR; INTRAVENOUS at 13:20

## 2019-03-31 RX ADMIN — OXYCODONE AND ACETAMINOPHEN 1 TABLET(S): 5; 325 TABLET ORAL at 22:05

## 2019-03-31 RX ADMIN — Medication 40 MILLIGRAM(S): at 22:22

## 2019-03-31 RX ADMIN — Medication 1 MILLIGRAM(S): at 22:22

## 2019-03-31 RX ADMIN — Medication 3 MILLILITER(S): at 14:29

## 2019-03-31 RX ADMIN — Medication 2 MILLIGRAM(S): at 17:37

## 2019-03-31 RX ADMIN — BUDESONIDE AND FORMOTEROL FUMARATE DIHYDRATE 2 PUFF(S): 160; 4.5 AEROSOL RESPIRATORY (INHALATION) at 10:56

## 2019-03-31 RX ADMIN — Medication 50 GRAM(S): at 08:38

## 2019-03-31 RX ADMIN — GABAPENTIN 800 MILLIGRAM(S): 400 CAPSULE ORAL at 21:37

## 2019-03-31 RX ADMIN — Medication 100 MILLIGRAM(S): at 09:14

## 2019-03-31 NOTE — PROGRESS NOTE ADULT - SUBJECTIVE AND OBJECTIVE BOX
ANASTACIO SIMENTAL  58y  Female      Patient is a 58y old  Female who presents with a chief complaint of SOB (31 Mar 2019 07:32)      INTERVAL HPI/OVERNIGHT EVENTS: Still wheezing with productive cough. STILL SMOKING      REVIEW OF SYSTEMS:  CONSTITUTIONAL: No fever, weight loss, or fatigue  EYES: No eye pain, visual disturbances, or discharge  ENMT:  No difficulty hearing, tinnitus, vertigo; No sinus or throat pain  NECK: No pain or stiffness  BREASTS: No pain, masses, or nipple discharge  RESPIRATORY: Productive cough with wheezing and chills, no hemoptysis; Shortness of breath  CARDIOVASCULAR: No chest pain, palpitations, dizziness, or leg swelling  GASTROINTESTINAL: No abdominal or epigastric pain. No nausea, vomiting, or hematemesis; No diarrhea or constipation. No melena or hematochezia.  GENITOURINARY: No dysuria, frequency, hematuria, or incontinence  NEUROLOGICAL: No headaches, memory loss, loss of strength, numbness, or tremors  SKIN: No itching, burning, rashes, or lesions   LYMPH NODES: No enlarged glands  ENDOCRINE: No heat or cold intolerance; No hair loss  MUSCULOSKELETAL: No joint pain or swelling; No muscle, back, or extremity pain  PSYCHIATRIC: No depression, anxiety, mood swings, or difficulty sleeping  HEME/LYMPH: No easy bruising, or bleeding gums  ALLERY AND IMMUNOLOGIC: No hives or eczema    T(C): 35.4 (03-31-19 @ 04:48), Max: 37.4 (03-30-19 @ 11:21)  HR: 74 (03-31-19 @ 04:48) (74 - 118)  BP: 107/67 (03-31-19 @ 04:48) (99/63 - 124/77)  RR: 18 (03-31-19 @ 04:48) (17 - 22)  SpO2: 95% (03-31-19 @ 04:48) (92% - 97%)  Wt(kg): --Vital Signs Last 24 Hrs  T(C): 35.4 (31 Mar 2019 04:48), Max: 37.4 (30 Mar 2019 11:21)  T(F): 95.8 (31 Mar 2019 04:48), Max: 99.4 (30 Mar 2019 11:21)  HR: 74 (31 Mar 2019 04:48) (74 - 118)  BP: 107/67 (31 Mar 2019 04:48) (99/63 - 124/77)  BP(mean): --  RR: 18 (31 Mar 2019 04:48) (17 - 22)  SpO2: 95% (31 Mar 2019 04:48) (92% - 97%)    PHYSICAL EXAM:  GENERAL: NAD, well-groomed, well-developed  HEAD:  Atraumatic, Normocephalic  EYES: EOMI, PERRLA, conjunctiva and sclera clear  ENMT: No tonsillar erythema, exudates, or enlargement; Moist mucous membranes, Good dentition, No lesions  NECK: Supple, No JVD, Normal thyroid  NERVOUS SYSTEM:  Alert & Oriented X3, Good concentration; Motor Strength 5/5 B/L upper and lower extremities; DTRs 2+ intact and symmetric  CHEST/LUNG: Bilateral crackles, expiratory wheezing  HEART: Regular rate and rhythm; No murmurs, rubs, or gallops  ABDOMEN: Soft, Nontender, Nondistended; Bowel sounds present  EXTREMITIES:  2+ Peripheral Pulses, No clubbing, cyanosis, or edema  LYMPH: No lymphadenopathy noted  SKIN: No rashes or lesions    Consultant(s) Notes Reviewed:  [x ] YES  [ ] NO    Discussed with Consultants/Other Providers [ x] YES     LABS                         15.9   7.21  )-----------( 206      ( 30 Mar 2019 12:30 )             46.6   03-30    136  |  101  |  19  ----------------------------<  100<H>  5.2<H>   |  21  |  0.9    Ca    9.3      30 Mar 2019 12:30  Mg     1.7     03-30    TPro  7.1  /  Alb  3.7  /  TBili  0.7  /  DBili  x   /  AST  38  /  ALT  32  /  AlkPhos  228<H>  03-30    MEDICATIONS  (STANDING):  ALBUTerol/ipratropium for Nebulization 3 milliLiter(s) Nebulizer every 6 hours  ALPRAZolam 1 milliGRAM(s) Oral at bedtime  buDESOnide 160 MICROgram(s)/formoterol 4.5 MICROgram(s) Inhaler 2 Puff(s) Inhalation two times a day  cefepime   IVPB      cefepime   IVPB 2000 milliGRAM(s) IV Intermittent every 8 hours  chlorhexidine 4% Liquid 1 Application(s) Topical <User Schedule>  clonazePAM Tablet 2 milliGRAM(s) Oral two times a day  gabapentin 800 milliGRAM(s) Oral three times a day  heparin  Injectable 5000 Unit(s) SubCutaneous every 8 hours  vancomycin  IVPB 1000 milliGRAM(s) IV Intermittent every 12 hours    MEDICATIONS  (PRN):  guaiFENesin    Syrup 100 milliGRAM(s) Oral every 6 hours PRN Cough  simethicone 80 milliGRAM(s) Chew every 8 hours PRN Dyspepsia      RADIOLOGY & ADDITIONAL TESTS:    Imaging Personally Reviewed:  [ ] YES  [ ] NO    HEALTH ISSUES - PROBLEM Dx:

## 2019-03-31 NOTE — PROGRESS NOTE ADULT - ASSESSMENT
1. HCAP (discharged from [facility] several weeks ago for CAP, did not complete course of antibiotics at home, however was in hospital several days, therefore would consider HCAP): IV antibiotics per ID (Dr. Soto please). Blood/sputum cultures and sensitivity. Aspiration precautions  2. COPD exacerbation: O2/bronchodilators/IV steroids per Pulmonary (Dr. Fulton please). PLEASE STOP SMOKING. PATIENT LEAVES ROOM PERIODICALLY TO SMOKE CIGARETTES  3. Hepatitis C by history  4. LS radiculopathy with chronic LBP: Gabapentin as ordered  5. PAD  6. DVT prophylaxis

## 2019-03-31 NOTE — PROGRESS NOTE ADULT - SUBJECTIVE AND OBJECTIVE BOX
ANASTACIO SIMENTAL 58y Female  MRN#: [id number]   CODE STATUS:________      SUBJECTIVE  Patient is a 58y old Female who presents with a chief complaint of SOB (30 Mar 2019 18:33)  Currently admitted to medicine with the primary diagnosis of Chronic obstructive pulmonary disease with acute exacerbation  Hospital course has been complicated by _______.   Today is hospital day 1d, and this morning she is _________ and reports ________ overnight events.     Present Today:           Cueto Catheter ()No/ ()Yes? Indication:          Central Line ()No/ ()Yes? Indication:          IV Fluids ()No/ ()Yes? Type:  Rate:  Indication:      OBJECTIVE  PAST MEDICAL & SURGICAL HISTORY  PVD (peripheral vascular disease)  Asthma  Bipolar affective  Hepatitis C  COPD (chronic obstructive pulmonary disease)  No significant past surgical history    ALLERGIES:  ampicillin (Unknown)  Lithium Carbonate (Unknown)    MEDICATIONS:  STANDING MEDICATIONS  ALBUTerol/ipratropium for Nebulization.. 3 milliLiter(s) Nebulizer every 20 minutes  ALPRAZolam 1 milliGRAM(s) Oral at bedtime  buDESOnide 160 MICROgram(s)/formoterol 4.5 MICROgram(s) Inhaler 2 Puff(s) Inhalation two times a day  cefepime   IVPB      cefepime   IVPB 2000 milliGRAM(s) IV Intermittent every 8 hours  clonazePAM Tablet 2 milliGRAM(s) Oral two times a day  gabapentin 800 milliGRAM(s) Oral three times a day  heparin  Injectable 5000 Unit(s) SubCutaneous every 8 hours  magnesium sulfate  IVPB 1 Gram(s) IV Intermittent once  vancomycin  IVPB 1000 milliGRAM(s) IV Intermittent every 12 hours    PRN MEDICATIONS      VITAL SIGNS: Last 24 Hours  T(C): 35.4 (31 Mar 2019 04:48), Max: 37.4 (30 Mar 2019 11:21)  T(F): 95.8 (31 Mar 2019 04:48), Max: 99.4 (30 Mar 2019 11:21)  HR: 74 (31 Mar 2019 04:48) (74 - 118)  BP: 107/67 (31 Mar 2019 04:48) (99/63 - 124/77)  BP(mean): --  RR: 18 (31 Mar 2019 04:48) (17 - 22)  SpO2: 95% (31 Mar 2019 04:48) (92% - 97%)    LABS:                        15.9   7.21  )-----------( 206      ( 30 Mar 2019 12:30 )             46.6     03-30    136  |  101  |  19  ----------------------------<  100<H>  5.2<H>   |  21  |  0.9    Ca    9.3      30 Mar 2019 12:30  Mg     1.7     03-30    TPro  7.1  /  Alb  3.7  /  TBili  0.7  /  DBili  x   /  AST  38  /  ALT  32  /  AlkPhos  228<H>  03-30                  RADIOLOGY:      PHYSICAL EXAM:    GENERAL: NAD, well-developed, AAOx3  HEENT:  Atraumatic, Normocephalic. EOMI, PERRLA, conjunctiva and sclera clear, No JVD  PULMONARY: Clear to auscultation bilaterally; No wheeze  CARDIOVASCULAR: Regular rate and rhythm; No murmurs, rubs, or gallops  GASTROINTESTINAL: Soft, Nontender, Nondistended; Bowel sounds present  MUSCULOSKELETAL:  2+ Peripheral Pulses, No clubbing, cyanosis, or edema  NEUROLOGY: non-focal  SKIN: No rashes or lesions      ADMISSION SUMMARY  Patient is a 58y old Female who presents with a chief complaint of SOB (30 Mar 2019 18:33)  Currently admitted to medicine with the primary diagnosis of Chronic obstructive pulmonary disease with acute exacerbation  Hospital course has been complicated by _______.       ASSESSMENT & PLAN    1. CHRONIC OBSTRUCTIVE PULMONARY DISEASE WITH ACUTE EXACERBATION      2.    3. PVD (peripheral vascular disease)  Asthma  Bipolar affective  Hepatitis C  COPD (chronic obstructive pulmonary disease)        Present today:  ( ) Congestive Heart Failure, Yes? ( )Acute / ( )Acute on Chronic / ( )Chronic  :  ( )Systolic / ( )Diastolic               Plan:  ( ) Complicated Pneumonia, Type?  ( )Parapneumonic effusion / ( )Abscess / ( ) Multilobar / ( )Other               Plan:  ( ) Morbid Obesity, Yes? BMI:               Plan:  ( ) Functional Quadriplegia               Plan:  ( ) Encephalopathy               Plan:    ( ) Discussion with patient and/or family regarding goals of care  ( ) Discussed Case and Plan with Medical Attending, Name: ANASTACIO SIMENTAL 58y Female  MRN#: [id number]   CODE STATUS:____Full ____      SUBJECTIVE  Patient is a 58y old Female who presents with a chief complaint of SOB (30 Mar 2019 18:33)  Currently admitted to medicine with the primary diagnosis of Chronic obstructive pulmonary disease with acute exacerbation  Today is hospital day 1d, and this morning she is having some gas pains in abd, still has SOB upon ambulation and reports no acute overnight events.   Denies CP or palpitations. Does endorse some leg edema from time to time, but no cardiac hx known.       OBJECTIVE  PAST MEDICAL & SURGICAL HISTORY  PVD (peripheral vascular disease)  Asthma  Bipolar affective  Hepatitis C  COPD (chronic obstructive pulmonary disease)  No significant past surgical history    ALLERGIES:  ampicillin (Unknown)  Lithium Carbonate (Unknown)    MEDICATIONS:  STANDING MEDICATIONS  ALBUTerol/ipratropium for Nebulization.. 3 milliLiter(s) Nebulizer every 20 minutes  ALPRAZolam 1 milliGRAM(s) Oral at bedtime  buDESOnide 160 MICROgram(s)/formoterol 4.5 MICROgram(s) Inhaler 2 Puff(s) Inhalation two times a day  cefepime   IVPB      cefepime   IVPB 2000 milliGRAM(s) IV Intermittent every 8 hours  clonazePAM Tablet 2 milliGRAM(s) Oral two times a day  gabapentin 800 milliGRAM(s) Oral three times a day  heparin  Injectable 5000 Unit(s) SubCutaneous every 8 hours  magnesium sulfate  IVPB 1 Gram(s) IV Intermittent once  vancomycin  IVPB 1000 milliGRAM(s) IV Intermittent every 12 hours    PRN MEDICATIONS      VITAL SIGNS: Last 24 Hours  T(C): 35.4 (31 Mar 2019 04:48), Max: 37.4 (30 Mar 2019 11:21)  T(F): 95.8 (31 Mar 2019 04:48), Max: 99.4 (30 Mar 2019 11:21)  HR: 74 (31 Mar 2019 04:48) (74 - 118)  BP: 107/67 (31 Mar 2019 04:48) (99/63 - 124/77)  BP(mean): --  RR: 18 (31 Mar 2019 04:48) (17 - 22)  SpO2: 95% (31 Mar 2019 04:48) (92% - 97%)    LABS:                        15.9   7.21  )-----------( 206      ( 30 Mar 2019 12:30 )             46.6     03-30    136  |  101  |  19  ----------------------------<  100<H>  5.2<H>   |  21  |  0.9    Ca    9.3      30 Mar 2019 12:30  Mg     1.7     03-30    TPro  7.1  /  Alb  3.7  /  TBili  0.7  /  DBili  x   /  AST  38  /  ALT  32  /  AlkPhos  228<H>  03-30      RADIOLOGY:      PHYSICAL EXAM:    GENERAL: NAD, well-developed lady off O2, no resp distress, in good spirits, AAOx3  HEENT:  Atraumatic, Normocephalic. EOMI, PERRLA, anicteric, No JVD  PULMONARY: wheezing on expiration, no areas of dullness, dry cough  CARDIOVASCULAR: Regular rate and rhythm; No murmurs  GASTROINTESTINAL: Soft, Nontender, Nondistended; Bowel sounds present  EXT:  2+ Peripheral Pulses, trace edema of ankles  NEUROLOGY: non-focal  SKIN: No rashes       ADMISSION SUMMARY  Patient is a 58y old Female who presents with a chief complaint of SOB (30 Mar 2019 18:33)  Currently admitted to medicine with the primary diagnosis of Chronic obstructive pulmonary disease with acute exacerbation  58 Year Old Female PMH of COPD not on home oxygen, PVD s/p stent, Hep C, Bipolar Disorder, current smoker (3cig/day) presents with shortness of breath and cough. Pt states that she was admitted to the hospital on 3/6 and d/c'ed home on 3/9 for COPD exacerbation and pneumonia (noted with RML infiltrate on initial cxr). Pt was treated with prednisone/solumedrol and abx as inpatient and was d/c'ed home with prednisone. She completed course of prednisone but continues to have productive cough, wheezing, dyspnea with exertion and intermittent fever. Pt stated that she had a fever ptp [id number], and was using Symbicort albuterol inhaler (4-5x per day) and nebulizer (2x per day), with no improvement of sx. Pt was not d/c'ed home with abx and has not followed with pulmonology since d/c. She denies any ha, neck pain, chest pain, abdominal pain, n/v/d, leg swelling. No recent travel or sick contacts. Pt did not get flu shot this year but did get pneumonia vaccine. Pt is still smoking 1/2 ppd. She has been intubated in the past, last time was 6 years ago.       ASSESSMENT & PLAN    #SOB likely 2/2 Pneumonia With Superimposed COPD Exacerbation   - f/u Pulmonary (Dr. Fulton)  - c/w Symbicort and Albuterol   - c/w Cefepime and Vanc for now  - De-escalate antibiotics once patient stable   - c/w Solu-Medrol 80 q8h    #Neuropathic Pain   - c/w Gabapentin 80 three times daily.     #Bipolar Disorder   - requested pt to bring in her meds, nonformulary, said she'd bring tmw    #MISC:   - DVT Prophylaxis: Heparin Sub q   - GI ppx: PPI  - from home, full code ANASTACIO SIMENTAL 58y Female  MRN#: [id number]   CODE STATUS:____Full ____      SUBJECTIVE  Patient is a 58y old Female who presents with a chief complaint of SOB (30 Mar 2019 18:33)  Currently admitted to medicine with the primary diagnosis of Chronic obstructive pulmonary disease with acute exacerbation  Today is hospital day 1d, and this morning she is having some gas pains in abd, still has SOB upon ambulation and reports no acute overnight events.   Denies CP or palpitations. Does endorse some leg edema from time to time, but no cardiac hx known.       OBJECTIVE  PAST MEDICAL & SURGICAL HISTORY  PVD (peripheral vascular disease)  Asthma  Bipolar affective  Hepatitis C  COPD (chronic obstructive pulmonary disease)  No significant past surgical history    ALLERGIES:  ampicillin (Unknown)  Lithium Carbonate (Unknown)    MEDICATIONS:  STANDING MEDICATIONS  ALBUTerol/ipratropium for Nebulization.. 3 milliLiter(s) Nebulizer every 20 minutes  ALPRAZolam 1 milliGRAM(s) Oral at bedtime  buDESOnide 160 MICROgram(s)/formoterol 4.5 MICROgram(s) Inhaler 2 Puff(s) Inhalation two times a day  cefepime   IVPB      cefepime   IVPB 2000 milliGRAM(s) IV Intermittent every 8 hours  clonazePAM Tablet 2 milliGRAM(s) Oral two times a day  gabapentin 800 milliGRAM(s) Oral three times a day  heparin  Injectable 5000 Unit(s) SubCutaneous every 8 hours  magnesium sulfate  IVPB 1 Gram(s) IV Intermittent once  vancomycin  IVPB 1000 milliGRAM(s) IV Intermittent every 12 hours    PRN MEDICATIONS      VITAL SIGNS: Last 24 Hours  T(C): 35.4 (31 Mar 2019 04:48), Max: 37.4 (30 Mar 2019 11:21)  T(F): 95.8 (31 Mar 2019 04:48), Max: 99.4 (30 Mar 2019 11:21)  HR: 74 (31 Mar 2019 04:48) (74 - 118)  BP: 107/67 (31 Mar 2019 04:48) (99/63 - 124/77)  BP(mean): --  RR: 18 (31 Mar 2019 04:48) (17 - 22)  SpO2: 95% (31 Mar 2019 04:48) (92% - 97%)    LABS:                        15.9   7.21  )-----------( 206      ( 30 Mar 2019 12:30 )             46.6     03-30    136  |  101  |  19  ----------------------------<  100<H>  5.2<H>   |  21  |  0.9    Ca    9.3      30 Mar 2019 12:30  Mg     1.7     03-30    TPro  7.1  /  Alb  3.7  /  TBili  0.7  /  DBili  x   /  AST  38  /  ALT  32  /  AlkPhos  228<H>  03-30      RADIOLOGY:      PHYSICAL EXAM:    GENERAL: NAD, well-developed lady off O2, no resp distress, in good spirits, AAOx3  HEENT:  Atraumatic, Normocephalic. EOMI, PERRLA, anicteric, No JVD  PULMONARY: wheezing on expiration, no areas of dullness, dry cough  CARDIOVASCULAR: Regular rate and rhythm; No murmurs  GASTROINTESTINAL: Soft, Nontender, Nondistended; Bowel sounds present  EXT:  2+ Peripheral Pulses, trace edema of ankles  NEUROLOGY: non-focal  SKIN: No rashes       ADMISSION SUMMARY  Patient is a 58y old Female who presents with a chief complaint of SOB (30 Mar 2019 18:33)  Currently admitted to medicine with the primary diagnosis of Chronic obstructive pulmonary disease with acute exacerbation  58 Year Old Female PMH of COPD not on home oxygen, PVD s/p stent, Hep C, Bipolar Disorder, current smoker (3cig/day) presents with shortness of breath and cough. Pt states that she was admitted to the hospital on 3/6 and d/c'ed home on 3/9 for COPD exacerbation and pneumonia (noted with RML infiltrate on initial cxr). Pt was treated with prednisone/solumedrol and abx as inpatient and was d/c'ed home with prednisone. She completed course of prednisone but continues to have productive cough, wheezing, dyspnea with exertion and intermittent fever. Pt stated that she had a fever ptp [id number], and was using Symbicort albuterol inhaler (4-5x per day) and nebulizer (2x per day), with no improvement of sx. Pt was not d/c'ed home with abx and has not followed with pulmonology since d/c. She denies any ha, neck pain, chest pain, abdominal pain, n/v/d, leg swelling. No recent travel or sick contacts. Pt did not get flu shot this year but did get pneumonia vaccine. Pt is still smoking 1/2 ppd. She has been intubated in the past, last time was 6 years ago.       ASSESSMENT & PLAN    #SOB likely 2/2 Pneumonia with underlying COPD Exacerbation   - as per pt, breathing status has steadily worsened over past few years, w/ increased QUARLES, but SaO2 ambulating 93%  - f/u Pulmonary (Dr. Fulton)  - c/w Symbicort and Albuterol   - c/w Cefepime and Vanc for now  - De-escalate antibiotics once patient stable   - c/w Solu-Medrol 80 q8h  - guaifenasin for cough  - f/u BNP, consider echo if elevated     #Neuropathic Pain   - c/w Gabapentin 80 three times daily.     #Bipolar Disorder   - requested pt to bring in her meds, nonformulary, said she'd bring tmw    #MISC:   - DVT Prophylaxis: Heparin Sub q   - GI ppx: PPI  - from home, full code

## 2019-04-01 LAB
ANION GAP SERPL CALC-SCNC: 13 MMOL/L — SIGNIFICANT CHANGE UP (ref 7–14)
ANION GAP SERPL CALC-SCNC: 14 MMOL/L — SIGNIFICANT CHANGE UP (ref 7–14)
BASOPHILS # BLD AUTO: 0.01 K/UL — SIGNIFICANT CHANGE UP (ref 0–0.2)
BASOPHILS NFR BLD AUTO: 0.1 % — SIGNIFICANT CHANGE UP (ref 0–1)
BUN SERPL-MCNC: 28 MG/DL — HIGH (ref 10–20)
BUN SERPL-MCNC: 30 MG/DL — HIGH (ref 10–20)
CALCIUM SERPL-MCNC: 9.2 MG/DL — SIGNIFICANT CHANGE UP (ref 8.5–10.1)
CALCIUM SERPL-MCNC: 9.6 MG/DL — SIGNIFICANT CHANGE UP (ref 8.5–10.1)
CHLORIDE SERPL-SCNC: 100 MMOL/L — SIGNIFICANT CHANGE UP (ref 98–110)
CHLORIDE SERPL-SCNC: 101 MMOL/L — SIGNIFICANT CHANGE UP (ref 98–110)
CO2 SERPL-SCNC: 22 MMOL/L — SIGNIFICANT CHANGE UP (ref 17–32)
CO2 SERPL-SCNC: 22 MMOL/L — SIGNIFICANT CHANGE UP (ref 17–32)
CREAT SERPL-MCNC: 0.8 MG/DL — SIGNIFICANT CHANGE UP (ref 0.7–1.5)
CREAT SERPL-MCNC: 0.8 MG/DL — SIGNIFICANT CHANGE UP (ref 0.7–1.5)
EOSINOPHIL # BLD AUTO: 0 K/UL — SIGNIFICANT CHANGE UP (ref 0–0.7)
EOSINOPHIL NFR BLD AUTO: 0 % — SIGNIFICANT CHANGE UP (ref 0–8)
GLUCOSE SERPL-MCNC: 102 MG/DL — HIGH (ref 70–99)
GLUCOSE SERPL-MCNC: 189 MG/DL — HIGH (ref 70–99)
HCT VFR BLD CALC: 40.1 % — SIGNIFICANT CHANGE UP (ref 37–47)
HGB BLD-MCNC: 13.3 G/DL — SIGNIFICANT CHANGE UP (ref 12–16)
IMM GRANULOCYTES NFR BLD AUTO: 0.7 % — HIGH (ref 0.1–0.3)
LYMPHOCYTES # BLD AUTO: 0.85 K/UL — LOW (ref 1.2–3.4)
LYMPHOCYTES # BLD AUTO: 10.4 % — LOW (ref 20.5–51.1)
MAGNESIUM SERPL-MCNC: 2 MG/DL — SIGNIFICANT CHANGE UP (ref 1.8–2.4)
MCHC RBC-ENTMCNC: 32.6 PG — HIGH (ref 27–31)
MCHC RBC-ENTMCNC: 33.2 G/DL — SIGNIFICANT CHANGE UP (ref 32–37)
MCV RBC AUTO: 98.3 FL — SIGNIFICANT CHANGE UP (ref 81–99)
MONOCYTES # BLD AUTO: 0.14 K/UL — SIGNIFICANT CHANGE UP (ref 0.1–0.6)
MONOCYTES NFR BLD AUTO: 1.7 % — SIGNIFICANT CHANGE UP (ref 1.7–9.3)
MRSA PCR RESULT.: NEGATIVE — SIGNIFICANT CHANGE UP
NEUTROPHILS # BLD AUTO: 7.11 K/UL — HIGH (ref 1.4–6.5)
NEUTROPHILS NFR BLD AUTO: 87.1 % — HIGH (ref 42.2–75.2)
NRBC # BLD: 0 /100 WBCS — SIGNIFICANT CHANGE UP (ref 0–0)
NT-PROBNP SERPL-SCNC: 403 PG/ML — HIGH (ref 0–300)
PLATELET # BLD AUTO: 185 K/UL — SIGNIFICANT CHANGE UP (ref 130–400)
POTASSIUM SERPL-MCNC: 5.1 MMOL/L — HIGH (ref 3.5–5)
POTASSIUM SERPL-MCNC: 5.3 MMOL/L — HIGH (ref 3.5–5)
POTASSIUM SERPL-SCNC: 5.1 MMOL/L — HIGH (ref 3.5–5)
POTASSIUM SERPL-SCNC: 5.3 MMOL/L — HIGH (ref 3.5–5)
RBC # BLD: 4.08 M/UL — LOW (ref 4.2–5.4)
RBC # FLD: 15 % — HIGH (ref 11.5–14.5)
SODIUM SERPL-SCNC: 136 MMOL/L — SIGNIFICANT CHANGE UP (ref 135–146)
SODIUM SERPL-SCNC: 136 MMOL/L — SIGNIFICANT CHANGE UP (ref 135–146)
VANCOMYCIN TROUGH SERPL-MCNC: 14.8 UG/ML — HIGH (ref 5–10)
WBC # BLD: 8.17 K/UL — SIGNIFICANT CHANGE UP (ref 4.8–10.8)
WBC # FLD AUTO: 8.17 K/UL — SIGNIFICANT CHANGE UP (ref 4.8–10.8)

## 2019-04-01 PROCEDURE — 71100 X-RAY EXAM RIBS UNI 2 VIEWS: CPT | Mod: 26,LT

## 2019-04-01 PROCEDURE — 76705 ECHO EXAM OF ABDOMEN: CPT | Mod: 26

## 2019-04-01 RX ORDER — ALPRAZOLAM 0.25 MG
0 TABLET ORAL
Qty: 120 | Refills: 0 | COMMUNITY

## 2019-04-01 RX ORDER — PANTOPRAZOLE SODIUM 20 MG/1
40 TABLET, DELAYED RELEASE ORAL
Qty: 0 | Refills: 0 | Status: DISCONTINUED | OUTPATIENT
Start: 2019-04-01 | End: 2019-04-03

## 2019-04-01 RX ORDER — TRAMADOL HYDROCHLORIDE 50 MG/1
50 TABLET ORAL EVERY 8 HOURS
Qty: 0 | Refills: 0 | Status: DISCONTINUED | OUTPATIENT
Start: 2019-04-01 | End: 2019-04-03

## 2019-04-01 RX ORDER — GUAIFENESIN/DEXTROMETHORPHAN 600MG-30MG
5 TABLET, EXTENDED RELEASE 12 HR ORAL EVERY 8 HOURS
Qty: 0 | Refills: 0 | Status: DISCONTINUED | OUTPATIENT
Start: 2019-04-01 | End: 2019-04-03

## 2019-04-01 RX ORDER — ERGOCALCIFEROL 1.25 MG/1
0 CAPSULE ORAL
Qty: 5 | Refills: 0 | COMMUNITY

## 2019-04-01 RX ORDER — DOXEPIN HCL 100 MG
1 CAPSULE ORAL
Qty: 0 | Refills: 0 | COMMUNITY

## 2019-04-01 RX ORDER — DOXEPIN HCL 100 MG
0 CAPSULE ORAL
Qty: 30 | Refills: 0 | COMMUNITY

## 2019-04-01 RX ORDER — ALPRAZOLAM 0.25 MG
1 TABLET ORAL AT BEDTIME
Qty: 0 | Refills: 0 | Status: DISCONTINUED | OUTPATIENT
Start: 2019-04-01 | End: 2019-04-03

## 2019-04-01 RX ORDER — CLONAZEPAM 1 MG
0 TABLET ORAL
Qty: 60 | Refills: 0 | COMMUNITY

## 2019-04-01 RX ORDER — PALIPERIDONE 1.5 MG/1
1.5 TABLET, EXTENDED RELEASE ORAL DAILY
Qty: 0 | Refills: 0 | Status: DISCONTINUED | OUTPATIENT
Start: 2019-04-01 | End: 2019-04-03

## 2019-04-01 RX ORDER — AZITHROMYCIN 500 MG/1
500 TABLET, FILM COATED ORAL EVERY 24 HOURS
Qty: 0 | Refills: 0 | Status: DISCONTINUED | OUTPATIENT
Start: 2019-04-01 | End: 2019-04-02

## 2019-04-01 RX ORDER — FUROSEMIDE 40 MG
40 TABLET ORAL ONCE
Qty: 0 | Refills: 0 | Status: COMPLETED | OUTPATIENT
Start: 2019-04-01 | End: 2019-04-01

## 2019-04-01 RX ORDER — METHOCARBAMOL 500 MG/1
750 TABLET, FILM COATED ORAL EVERY 8 HOURS
Qty: 0 | Refills: 0 | Status: DISCONTINUED | OUTPATIENT
Start: 2019-04-01 | End: 2019-04-03

## 2019-04-01 RX ORDER — CLONAZEPAM 1 MG
1 TABLET ORAL
Qty: 0 | Refills: 0 | COMMUNITY

## 2019-04-01 RX ORDER — OXYCODONE AND ACETAMINOPHEN 5; 325 MG/1; MG/1
1 TABLET ORAL ONCE
Qty: 0 | Refills: 0 | Status: DISCONTINUED | OUTPATIENT
Start: 2019-04-01 | End: 2019-04-01

## 2019-04-01 RX ORDER — ESCITALOPRAM OXALATE 10 MG/1
10 TABLET, FILM COATED ORAL DAILY
Qty: 0 | Refills: 0 | Status: DISCONTINUED | OUTPATIENT
Start: 2019-04-01 | End: 2019-04-03

## 2019-04-01 RX ORDER — PALIPERIDONE 1.5 MG/1
1 TABLET, EXTENDED RELEASE ORAL
Qty: 0 | Refills: 0 | COMMUNITY

## 2019-04-01 RX ORDER — CLONAZEPAM 1 MG
2 TABLET ORAL EVERY 8 HOURS
Qty: 0 | Refills: 0 | Status: DISCONTINUED | OUTPATIENT
Start: 2019-04-01 | End: 2019-04-03

## 2019-04-01 RX ADMIN — GABAPENTIN 800 MILLIGRAM(S): 400 CAPSULE ORAL at 23:17

## 2019-04-01 RX ADMIN — Medication 2 MILLIGRAM(S): at 15:31

## 2019-04-01 RX ADMIN — BUDESONIDE AND FORMOTEROL FUMARATE DIHYDRATE 2 PUFF(S): 160; 4.5 AEROSOL RESPIRATORY (INHALATION) at 08:33

## 2019-04-01 RX ADMIN — CEFEPIME 100 MILLIGRAM(S): 1 INJECTION, POWDER, FOR SOLUTION INTRAMUSCULAR; INTRAVENOUS at 15:13

## 2019-04-01 RX ADMIN — ESCITALOPRAM OXALATE 10 MILLIGRAM(S): 10 TABLET, FILM COATED ORAL at 17:22

## 2019-04-01 RX ADMIN — Medication 250 MILLIGRAM(S): at 05:59

## 2019-04-01 RX ADMIN — Medication 3 MILLILITER(S): at 19:49

## 2019-04-01 RX ADMIN — GABAPENTIN 800 MILLIGRAM(S): 400 CAPSULE ORAL at 05:57

## 2019-04-01 RX ADMIN — Medication 3 MILLILITER(S): at 08:23

## 2019-04-01 RX ADMIN — PANTOPRAZOLE SODIUM 40 MILLIGRAM(S): 20 TABLET, DELAYED RELEASE ORAL at 09:04

## 2019-04-01 RX ADMIN — METHOCARBAMOL 750 MILLIGRAM(S): 500 TABLET, FILM COATED ORAL at 17:21

## 2019-04-01 RX ADMIN — BUDESONIDE AND FORMOTEROL FUMARATE DIHYDRATE 2 PUFF(S): 160; 4.5 AEROSOL RESPIRATORY (INHALATION) at 22:24

## 2019-04-01 RX ADMIN — Medication 2 MILLIGRAM(S): at 05:56

## 2019-04-01 RX ADMIN — CHLORHEXIDINE GLUCONATE 1 APPLICATION(S): 213 SOLUTION TOPICAL at 05:56

## 2019-04-01 RX ADMIN — Medication 2 MILLIGRAM(S): at 22:22

## 2019-04-01 RX ADMIN — Medication 3 MILLILITER(S): at 13:37

## 2019-04-01 RX ADMIN — Medication 40 MILLIGRAM(S): at 17:21

## 2019-04-01 RX ADMIN — Medication 1 MILLIGRAM(S): at 22:22

## 2019-04-01 RX ADMIN — Medication 3 MILLILITER(S): at 02:11

## 2019-04-01 RX ADMIN — CEFEPIME 100 MILLIGRAM(S): 1 INJECTION, POWDER, FOR SOLUTION INTRAMUSCULAR; INTRAVENOUS at 05:56

## 2019-04-01 RX ADMIN — GABAPENTIN 800 MILLIGRAM(S): 400 CAPSULE ORAL at 15:14

## 2019-04-01 RX ADMIN — OXYCODONE AND ACETAMINOPHEN 1 TABLET(S): 5; 325 TABLET ORAL at 12:41

## 2019-04-01 RX ADMIN — CEFEPIME 100 MILLIGRAM(S): 1 INJECTION, POWDER, FOR SOLUTION INTRAMUSCULAR; INTRAVENOUS at 22:23

## 2019-04-01 RX ADMIN — Medication 250 MILLIGRAM(S): at 18:56

## 2019-04-01 RX ADMIN — AZITHROMYCIN 255 MILLIGRAM(S): 500 TABLET, FILM COATED ORAL at 12:04

## 2019-04-01 RX ADMIN — Medication 60 MILLIGRAM(S): at 09:04

## 2019-04-01 RX ADMIN — OXYCODONE AND ACETAMINOPHEN 1 TABLET(S): 5; 325 TABLET ORAL at 12:11

## 2019-04-01 NOTE — PROGRESS NOTE ADULT - ASSESSMENT
1. HCAP: IV antibiotics as ordered. ID consult please. Pulmonary consult please. STOP SMOKING IN HOSPITAL. Aspiration precautions  2. COPD exacerbation: O2/bronchodilators/IV steroids per Pulmonary. Transition to PO prednisone when appropriate. Still wheezing considerably. STOP SMOKING IN HOSPITAL  3. Chronic LBP/LS radiculopathy: Gabapentin as ordered. OOB to chair, ambulation as tolerated  4. PAD  5. H/O Hep C  6. Generalized anxiety 1. HCAP: IV antibiotics as ordered. ID consult please. Pulmonary consult please. STOP SMOKING IN HOSPITAL. Aspiration precautions  2. COPD exacerbation: O2/bronchodilators/IV steroids per Pulmonary. Transition to PO prednisone when appropriate. Still wheezing considerably. STOP SMOKING IN HOSPITAL  3. Chronic LBP/LS radiculopathy: Gabapentin as ordered. OOB to chair, ambulation as tolerated  4. PAD  5. H/O Hep C. Abdominal distension, R/O ascites: Abdominal sonogram. ID consult  6. Generalized anxiety  7. Left lower rib cage pain, R/O fracture: Left rib series x-rays

## 2019-04-01 NOTE — CONSULT NOTE ADULT - SUBJECTIVE AND OBJECTIVE BOX
ANASTACIO SIMENTAL 58yFemalePatient is a 58y old  Female who presents with a chief complaint of SOB (01 Apr 2019 07:25)      Patient has history of:  ampicillin (Unknown)  Lithium Carbonate (Unknown)        CHRONIC OBSTRUCTIVE PULMONARY DISEASE WITH ACUTE EXACERBATION  ^SOB COUGH  Yes  No pertinent family history in first degree relatives  Handoff  MEWS Score  PVD (peripheral vascular disease)  Asthma  Bipolar affective  Hepatitis C  COPD (chronic obstructive pulmonary disease)  Chronic obstructive pulmonary disease with acute exacerbation  No significant past surgical history  SOB COUGH  20  Pneumonia of right middle lobe due to infectious organism        Patient treated with:  cefepime   IVPB      cefepime   IVPB 2000 milliGRAM(s) IV Intermittent every 8 hours  vancomycin  IVPB 1000 milliGRAM(s) IV Intermittent every 12 hours        PHYSICAL EXAM  T(F): 96.6 (04-01-19 @ 05:19), Max: 98.5 (03-31-19 @ 12:27)  HR: 77 (04-01-19 @ 05:19) (77 - 99)  BP: 100/59 (04-01-19 @ 05:19) (100/59 - 147/89)  RR: 16 (04-01-19 @ 05:19) (16 - 18)  SpO2: 94% (03-31-19 @ 19:53) (94% - 94%)  Daily     Daily   HEENT: normal, no nuchal rigidity  Cor: RSR Nl S1 S2  Lungs: clear  Decreased breath sounds at bases    Abdomen: Nontender, Nl BS,     Ext: No clubbing,cyanosis or edema    LAB & RADIOLOGIC RESULTS:                        15.9   7.21  )-----------( 206      ( 30 Mar 2019 12:30 )             46.6         03-30    136  |  101  |  19  ----------------------------<  100<H>  5.2<H>   |  21  |  0.9    Mg     1.7     03-30    TPro  7.1  /  Alb  3.7  /  TBili  0.7  /  DBili  x   /  AST  38  /  ALT  32  /  AlkPhos  228<H>  03-30    <--<9>>0.9

## 2019-04-01 NOTE — CONSULT NOTE ADULT - ASSESSMENT
IMPRESSION  Pt admitted with hx T101 & fever with suspected gram negative pneumonia (hx COPD)    Pt with hx COPD, PVD, Asthma, Bipolar, Hepatitis C  FLU & RSV all negative  CT chest with Groundglass opacifications as described in a crazy paving type distribution. While findings may be indicative of small airways disease,   other entities are certainly within the differential including different types of hypersensitivity pneumonitis.   Cxray with Right middle lobe opacity.      On cefepime   IVPB 2000 milliGRAM(s) IV Intermittent every 8 hours  vancomycin  IVPB 1000 milliGRAM(s) IV Intermittent every 12 hours  Patient has history of:  ampicillin (Unknown)    Pt with hypomagnesemia,     SUGGESTIONs  Nasal MRSA PCR: if negative D/C Vanco; If positive: continue Vanco and do Vanco trough.  Continue Cefepime  Pulmonary consult  Urine legionella and pneumococcal antigens  Add IV Zithromax (D/C if urine legionella antigen is negative)
Acute respiratory failure  Copd exacerabtion  Abnormal Ct Chest/ILD verse Pulmonary edema -(more likely pulmonary edema-GG opacities/intralobular septal thickening)  Doubt Pneumona  Continuos tobacco abuse/Smoking cessation counseling      02 via nc  increase solumedrol 60 mg iv q 12hours  symbicort 160 mg 2 puffs bid  cont empiric abx for now  follow up pan culture  probnp elevated  please obtain 2 d echo  lasix 40 mg iv x1  avoid volume overload  monitor i/os cr and lytes  smoking cessation counseling - pt continuously leaves medical floor to smoke   dvt/gi px  ambulate as tolerated

## 2019-04-01 NOTE — PROGRESS NOTE ADULT - SUBJECTIVE AND OBJECTIVE BOX
ANASTACIO SIMENTAL  58y  Female      Patient is a 58y old  Female who presents with a chief complaint of SOB (31 Mar 2019 10:35)      INTERVAL HPI/OVERNIGHT EVENTS: Still coughing/wheezing. Somewhat  improved. STILL SMOKING, GETTING CIGARETTES FROM FRIEND      REVIEW OF SYSTEMS:  CONSTITUTIONAL: No fever, weight loss, or fatigue  EYES: No eye pain, visual disturbances, or discharge  ENMT:  No difficulty hearing, tinnitus, vertigo; No sinus or throat pain  NECK: No pain or stiffness  BREASTS: No pain, masses, or nipple discharge  RESPIRATORY: Productive cough, wheezing. No chills or hemoptysis; Shortness of breath on exertion  CARDIOVASCULAR: No chest pain, palpitations, dizziness, or leg swelling  GASTROINTESTINAL: No abdominal or epigastric pain. No nausea, vomiting, or hematemesis; No diarrhea or constipation. No melena or hematochezia.  GENITOURINARY: No dysuria, frequency, hematuria, or incontinence  NEUROLOGICAL: No headaches, memory loss, loss of strength, numbness, or tremors  SKIN: No itching, burning, rashes, or lesions   LYMPH NODES: No enlarged glands  ENDOCRINE: No heat or cold intolerance; No hair loss  MUSCULOSKELETAL: No joint pain or swelling; No muscle, back, or extremity pain  PSYCHIATRIC: No depression, anxiety, mood swings, or difficulty sleeping  HEME/LYMPH: No easy bruising, or bleeding gums  ALLERY AND IMMUNOLOGIC: No hives or eczema    T(C): 35.9 (04-01-19 @ 05:19), Max: 36.9 (03-31-19 @ 12:27)  HR: 77 (04-01-19 @ 05:19) (77 - 99)  BP: 100/59 (04-01-19 @ 05:19) (100/59 - 147/89)  RR: 16 (04-01-19 @ 05:19) (16 - 18)  SpO2: 94% (03-31-19 @ 19:53) (94% - 94%)  Wt(kg): --Vital Signs Last 24 Hrs  T(C): 35.9 (01 Apr 2019 05:19), Max: 36.9 (31 Mar 2019 12:27)  T(F): 96.6 (01 Apr 2019 05:19), Max: 98.5 (31 Mar 2019 12:27)  HR: 77 (01 Apr 2019 05:19) (77 - 99)  BP: 100/59 (01 Apr 2019 05:19) (100/59 - 147/89)  BP(mean): --  RR: 16 (01 Apr 2019 05:19) (16 - 18)  SpO2: 94% (31 Mar 2019 19:53) (94% - 94%)    PHYSICAL EXAM:  GENERAL: NAD, well-groomed, well-developed  HEAD:  Atraumatic, Normocephalic  EYES: EOMI, PERRLA, conjunctiva and sclera clear  ENMT: No tonsillar erythema, exudates, or enlargement; Moist mucous membranes, Good dentition, No lesions  NECK: Supple, No JVD, Normal thyroid  NERVOUS SYSTEM:  Alert & Oriented X3, Good concentration; Motor Strength 5/5 B/L upper and lower extremities; DTRs 2+ intact and symmetric  CHEST/LUNG: Expiratory wheezing,crackles bilaterally, R>L   HEART: Regular rate and rhythm; No murmurs, rubs, or gallops  ABDOMEN: Soft, Nontender, Nondistended; Bowel sounds present  EXTREMITIES:  2+ Peripheral Pulses, No clubbing, cyanosis, or edema  LYMPH: No lymphadenopathy noted  SKIN: No rashes or lesions    Consultant(s) Notes Reviewed:  [x ] YES  [ ] NO    Discussed with Consultants/Other Providers [ x] YES     LABS                         15.9   7.21  )-----------( 206      ( 30 Mar 2019 12:30 )             46.6   03-30    136  |  101  |  19  ----------------------------<  100<H>  5.2<H>   |  21  |  0.9    Ca    9.3      30 Mar 2019 12:30  Mg     1.7     03-30    TPro  7.1  /  Alb  3.7  /  TBili  0.7  /  DBili  x   /  AST  38  /  ALT  32  /  AlkPhos  228<H>  03-30        RADIOLOGY & ADDITIONAL TESTS:    Imaging Personally Reviewed:  [ ] YES  [ ] NO    MEDICATIONS  (STANDING):  ALBUTerol/ipratropium for Nebulization 3 milliLiter(s) Nebulizer every 6 hours  ALPRAZolam 1 milliGRAM(s) Oral at bedtime  buDESOnide 160 MICROgram(s)/formoterol 4.5 MICROgram(s) Inhaler 2 Puff(s) Inhalation two times a day  cefepime   IVPB      cefepime   IVPB 2000 milliGRAM(s) IV Intermittent every 8 hours  chlorhexidine 4% Liquid 1 Application(s) Topical <User Schedule>  clonazePAM Tablet 2 milliGRAM(s) Oral two times a day  gabapentin 800 milliGRAM(s) Oral three times a day  heparin  Injectable 5000 Unit(s) SubCutaneous every 8 hours  vancomycin  IVPB 1000 milliGRAM(s) IV Intermittent every 12 hours    MEDICATIONS  (PRN):  acetaminophen   Tablet .. 650 milliGRAM(s) Oral every 6 hours PRN Mild Pain (1 - 3)  guaiFENesin    Syrup 100 milliGRAM(s) Oral every 6 hours PRN Cough  simethicone 80 milliGRAM(s) Chew every 8 hours PRN Dyspepsia  HEALTH ISSUES - PROBLEM Dx: ANASTACIO SIMENTAL  58y  Female      Patient is a 58y old  Female who presents with a chief complaint of SOB (31 Mar 2019 10:35)      INTERVAL HPI/OVERNIGHT EVENTS: Still coughing/wheezing. Somewhat  improved. Having left-sided rib pain and abdominal distension. STILL SMOKING, GETTING CIGARETTES FROM FRIEND      REVIEW OF SYSTEMS:  CONSTITUTIONAL: No fever, weight loss, or fatigue  EYES: No eye pain, visual disturbances, or discharge  ENMT:  No difficulty hearing, tinnitus, vertigo; No sinus or throat pain  NECK: No pain or stiffness  BREASTS: No pain, masses, or nipple discharge  RESPIRATORY: Productive cough, wheezing. No chills or hemoptysis; Shortness of breath on exertion  CARDIOVASCULAR: No chest pain, palpitations, dizziness, or leg swelling  GASTROINTESTINAL: No abdominal or epigastric pain. No nausea, vomiting, or hematemesis; No diarrhea or constipation. No melena or hematochezia. Abdominal distension  GENITOURINARY: No dysuria, frequency, hematuria, or incontinence  NEUROLOGICAL: No headaches, memory loss, loss of strength, numbness, or tremors  SKIN: No itching, burning, rashes, or lesions   LYMPH NODES: No enlarged glands  ENDOCRINE: No heat or cold intolerance; No hair loss  MUSCULOSKELETAL: No joint pain or swelling; No muscle, back, or extremity pain. Left lower rib cage pain  PSYCHIATRIC: No depression, anxiety, mood swings, or difficulty sleeping  HEME/LYMPH: No easy bruising, or bleeding gums  ALLERY AND IMMUNOLOGIC: No hives or eczema    T(C): 35.9 (04-01-19 @ 05:19), Max: 36.9 (03-31-19 @ 12:27)  HR: 77 (04-01-19 @ 05:19) (77 - 99)  BP: 100/59 (04-01-19 @ 05:19) (100/59 - 147/89)  RR: 16 (04-01-19 @ 05:19) (16 - 18)  SpO2: 94% (03-31-19 @ 19:53) (94% - 94%)  Wt(kg): --Vital Signs Last 24 Hrs  T(C): 35.9 (01 Apr 2019 05:19), Max: 36.9 (31 Mar 2019 12:27)  T(F): 96.6 (01 Apr 2019 05:19), Max: 98.5 (31 Mar 2019 12:27)  HR: 77 (01 Apr 2019 05:19) (77 - 99)  BP: 100/59 (01 Apr 2019 05:19) (100/59 - 147/89)  BP(mean): --  RR: 16 (01 Apr 2019 05:19) (16 - 18)  SpO2: 94% (31 Mar 2019 19:53) (94% - 94%)    PHYSICAL EXAM:  GENERAL: NAD, well-groomed, well-developed  HEAD:  Atraumatic, Normocephalic  EYES: EOMI, PERRLA, conjunctiva and sclera clear  ENMT: No tonsillar erythema, exudates, or enlargement; Moist mucous membranes, Good dentition, No lesions  NECK: Supple, No JVD, Normal thyroid  NERVOUS SYSTEM:  Alert & Oriented X3, Good concentration; Motor Strength 5/5 B/L upper and lower extremities; DTRs 2+ intact and symmetric  CHEST/LUNG: Expiratory wheezing,crackles bilaterally, R>L. Left lower rib cage tenderness  HEART: Regular rate and rhythm; No murmurs, rubs, or gallops  ABDOMEN: Soft, Nontender, distended; Bowel sounds present  EXTREMITIES:  2+ Peripheral Pulses, No clubbing, cyanosis, or edema  LYMPH: No lymphadenopathy noted  SKIN: No rashes or lesions    Consultant(s) Notes Reviewed:  [x ] YES  [ ] NO    Discussed with Consultants/Other Providers [ x] YES     LABS                         15.9   7.21  )-----------( 206      ( 30 Mar 2019 12:30 )             46.6   03-30    136  |  101  |  19  ----------------------------<  100<H>  5.2<H>   |  21  |  0.9    Ca    9.3      30 Mar 2019 12:30  Mg     1.7     03-30    TPro  7.1  /  Alb  3.7  /  TBili  0.7  /  DBili  x   /  AST  38  /  ALT  32  /  AlkPhos  228<H>  03-30        RADIOLOGY & ADDITIONAL TESTS:    Imaging Personally Reviewed:  [ ] YES  [ ] NO    MEDICATIONS  (STANDING):  ALBUTerol/ipratropium for Nebulization 3 milliLiter(s) Nebulizer every 6 hours  ALPRAZolam 1 milliGRAM(s) Oral at bedtime  buDESOnide 160 MICROgram(s)/formoterol 4.5 MICROgram(s) Inhaler 2 Puff(s) Inhalation two times a day  cefepime   IVPB      cefepime   IVPB 2000 milliGRAM(s) IV Intermittent every 8 hours  chlorhexidine 4% Liquid 1 Application(s) Topical <User Schedule>  clonazePAM Tablet 2 milliGRAM(s) Oral two times a day  gabapentin 800 milliGRAM(s) Oral three times a day  heparin  Injectable 5000 Unit(s) SubCutaneous every 8 hours  vancomycin  IVPB 1000 milliGRAM(s) IV Intermittent every 12 hours    MEDICATIONS  (PRN):  acetaminophen   Tablet .. 650 milliGRAM(s) Oral every 6 hours PRN Mild Pain (1 - 3)  guaiFENesin    Syrup 100 milliGRAM(s) Oral every 6 hours PRN Cough  simethicone 80 milliGRAM(s) Chew every 8 hours PRN Dyspepsia  HEALTH ISSUES - PROBLEM Dx:

## 2019-04-01 NOTE — CONSULT NOTE ADULT - SUBJECTIVE AND OBJECTIVE BOX
THIS IS A DRAFT    Patient seen and examined earlier today.    Case discussed with primary team.    Complete documentation to follow.    For any question, please contact me:  Dr. Dominique Fulton  Office: [phone] ANASTACIO SIMENTAL  MRN-[id number]    HISTORY OF PRESENT ILLNESS:    58 Year Old Female PMH of COPD, not currently on home oxygen, PVD, s/p stent, Hep C, Bipolar Disorder, current smoker presents with shortness of breath and cough. Pt states that she was admitted to the hospital on 3/6 and d/c'ed home on 3/9. Pt was admitted for copd exacerbation and pneumonia (noted with RML infiltrate on initial cxr). Pt was treated with prednisone/solumedrol and abx as inpatient and was d/c'ed home with prednisone. She completed course of prednisone but continues to have productive cough, wheezing, dyspnea with exertion and intermittent fever. Pt states that she had a fever last night, [id number]. Pt states that she has been using Symbicort albuterol inhaler (4-5x per day) and nebulizer (2x per day), with no improvement of sx. She denies any ha, neck pain, chest pain, abdominal pain, n/v/d, leg swelling. No recent travel or sick contacts. Pt did not get flu shot this year but did get pneumonia vaccine. Pt is still smoking 1/2 ppd. She has been intubated in the past, last time was 6 years ago. (30 Mar 2019 18:33)      PMH/PSH:  PAST MEDICAL & SURGICAL HISTORY:  PVD (peripheral vascular disease)  Asthma  Bipolar affective  Hepatitis C  COPD (chronic obstructive pulmonary disease)  No significant past surgical history    ALLERGIES:  Allergies    ampicillin (Unknown)  Lithium Carbonate (Unknown)    Intolerances      SOCIAL HABITS:  tobacco abuse- continous    FAMILY HISTORY:   FAMILY HISTORY:  No pertinent family history in first degree relatives      REVIEW OF SYSTEM:  Elements of review of systems are negative or non-applicable except as noted above in HPI section.       HOME MEDICATIONS:  CLONAZEPAM 2 MG TABLET  DOXEPIN 50 MG CAPSULE  ESCITALOPRAM 10 MG TABLET  GABAPENTIN 800 MG TABLET  paliperidone 1.5 mg oral tablet, extended release  VITAMIN D2 1.25MG(50,000 UNIT)    MEDICATIONS:  MEDICATIONS  (STANDING):  ALBUTerol/ipratropium for Nebulization 3 milliLiter(s) Nebulizer every 6 hours  ALPRAZolam 1 milliGRAM(s) Oral at bedtime  azithromycin  IVPB 500 milliGRAM(s) IV Intermittent every 24 hours  buDESOnide 160 MICROgram(s)/formoterol 4.5 MICROgram(s) Inhaler 2 Puff(s) Inhalation two times a day  cefepime   IVPB      cefepime   IVPB 2000 milliGRAM(s) IV Intermittent every 8 hours  chlorhexidine 4% Liquid 1 Application(s) Topical <User Schedule>  clonazePAM Tablet 2 milliGRAM(s) Oral every 8 hours  escitalopram 10 milliGRAM(s) Oral daily  gabapentin 800 milliGRAM(s) Oral three times a day  heparin  Injectable 5000 Unit(s) SubCutaneous every 8 hours  methylPREDNISolone sodium succinate Injectable 40 milliGRAM(s) IV Push every 12 hours  paliperidone ER. 1.5 milliGRAM(s) Oral daily  pantoprazole    Tablet 40 milliGRAM(s) Oral before breakfast  vancomycin  IVPB 1000 milliGRAM(s) IV Intermittent every 12 hours    MEDICATIONS  (PRN):  acetaminophen   Tablet .. 650 milliGRAM(s) Oral every 6 hours PRN Mild Pain (1 - 3)  guaiFENesin/dextromethorphan  Syrup 5 milliLiter(s) Oral every 8 hours PRN Cough  methocarbamol 750 milliGRAM(s) Oral every 8 hours PRN muscle pain  simethicone 80 milliGRAM(s) Chew every 8 hours PRN Dyspepsia  traMADol 50 milliGRAM(s) Oral every 8 hours PRN Severe Pain (7 - 10)        VITALS:   Vital Signs Last 24 Hrs  T(C): 36.4 (01 Apr 2019 20:49), Max: 36.4 (01 Apr 2019 20:49)  T(F): 97.5 (01 Apr 2019 20:49), Max: 97.5 (01 Apr 2019 20:49)  HR: 73 (01 Apr 2019 20:49) (70 - 77)  BP: 130/75 (01 Apr 2019 20:49) (100/59 - 130/75)  BP(mean): --  RR: 18 (01 Apr 2019 20:49) (16 - 18)  SpO2: 95% (01 Apr 2019 08:41) (95% - 95%)        PHYSICAL EXAM:    GENERAL: NAD  HEAD:  Atraumatic, Normocephalic  NECK: Supple, No JVD  CHEST/LUNG:  diffuse wheeze  HEART: Regular rate and rhythm; No murmurs  ABDOMEN: Soft, Nontender, Nondistended  EXTREMITIES:  Good peripheral Pulses, No clubbing, cyanosis, or edema      LABS:                        13.3   8.17  )-----------( 185      ( 01 Apr 2019 08:35 )             40.1     04-01    136  |  101  |  28<H>  ----------------------------<  102<H>  5.3<H>   |  22  |  0.8    Ca    9.6      01 Apr 2019 18:48  Mg     2.0     04-01                      DIAGNOSTIC STUDIES:    < from: Xray Chest 2 Views PA/Lat (03.30.19 @ 14:33) >  Impression:      Bibasilar opacities, concerning for pneumonia. No pleural effusion or   pneumothorax.        < end of copied text >  < from: CT Chest No Cont (03.08.19 @ 13:49) >    IMPRESSION:    Groundglass opacifications as described in a crazy paving type   distribution. While findings may be indicative of small airways disease,   other entities are certainly within thedifferential including different   types of hypersensitivity pneumonitis.     < end of copied text >

## 2019-04-02 ENCOUNTER — TRANSCRIPTION ENCOUNTER (OUTPATIENT)
Age: 59
End: 2019-04-02

## 2019-04-02 LAB
ALBUMIN SERPL ELPH-MCNC: 4.4 G/DL — SIGNIFICANT CHANGE UP (ref 3.5–5.2)
ALP SERPL-CCNC: 185 U/L — HIGH (ref 30–115)
ALT FLD-CCNC: 24 U/L — SIGNIFICANT CHANGE UP (ref 0–41)
ANION GAP SERPL CALC-SCNC: 15 MMOL/L — HIGH (ref 7–14)
AST SERPL-CCNC: 17 U/L — SIGNIFICANT CHANGE UP (ref 0–41)
BILIRUB SERPL-MCNC: 0.3 MG/DL — SIGNIFICANT CHANGE UP (ref 0.2–1.2)
BUN SERPL-MCNC: 34 MG/DL — HIGH (ref 10–20)
CALCIUM SERPL-MCNC: 10.2 MG/DL — HIGH (ref 8.5–10.1)
CHLORIDE SERPL-SCNC: 98 MMOL/L — SIGNIFICANT CHANGE UP (ref 98–110)
CO2 SERPL-SCNC: 23 MMOL/L — SIGNIFICANT CHANGE UP (ref 17–32)
CREAT SERPL-MCNC: 0.9 MG/DL — SIGNIFICANT CHANGE UP (ref 0.7–1.5)
GLUCOSE SERPL-MCNC: 113 MG/DL — HIGH (ref 70–99)
LEGIONELLA AG UR QL: NEGATIVE — SIGNIFICANT CHANGE UP
MAGNESIUM SERPL-MCNC: 2.2 MG/DL — SIGNIFICANT CHANGE UP (ref 1.8–2.4)
POTASSIUM SERPL-MCNC: 4.8 MMOL/L — SIGNIFICANT CHANGE UP (ref 3.5–5)
POTASSIUM SERPL-SCNC: 4.8 MMOL/L — SIGNIFICANT CHANGE UP (ref 3.5–5)
PROT SERPL-MCNC: 7.8 G/DL — SIGNIFICANT CHANGE UP (ref 6–8)
SODIUM SERPL-SCNC: 136 MMOL/L — SIGNIFICANT CHANGE UP (ref 135–146)

## 2019-04-02 PROCEDURE — 71045 X-RAY EXAM CHEST 1 VIEW: CPT | Mod: 26

## 2019-04-02 RX ORDER — ESCITALOPRAM OXALATE 10 MG/1
0 TABLET, FILM COATED ORAL
Qty: 30 | Refills: 0 | COMMUNITY

## 2019-04-02 RX ORDER — ESCITALOPRAM OXALATE 10 MG/1
1 TABLET, FILM COATED ORAL
Qty: 30 | Refills: 0 | COMMUNITY

## 2019-04-02 RX ORDER — FUROSEMIDE 40 MG
1 TABLET ORAL
Qty: 30 | Refills: 0 | OUTPATIENT
Start: 2019-04-02 | End: 2019-05-01

## 2019-04-02 RX ORDER — FUROSEMIDE 40 MG
40 TABLET ORAL
Qty: 0 | Refills: 0 | Status: COMPLETED | OUTPATIENT
Start: 2019-04-02 | End: 2019-04-02

## 2019-04-02 RX ORDER — GABAPENTIN 400 MG/1
0 CAPSULE ORAL
Qty: 90 | Refills: 0 | COMMUNITY

## 2019-04-02 RX ORDER — GABAPENTIN 400 MG/1
1 CAPSULE ORAL
Qty: 90 | Refills: 0 | COMMUNITY

## 2019-04-02 RX ORDER — FUROSEMIDE 40 MG
20 TABLET ORAL DAILY
Qty: 0 | Refills: 0 | Status: DISCONTINUED | OUTPATIENT
Start: 2019-04-03 | End: 2019-04-03

## 2019-04-02 RX ADMIN — GABAPENTIN 800 MILLIGRAM(S): 400 CAPSULE ORAL at 21:46

## 2019-04-02 RX ADMIN — Medication 1 MILLIGRAM(S): at 21:46

## 2019-04-02 RX ADMIN — Medication 2 MILLIGRAM(S): at 21:46

## 2019-04-02 RX ADMIN — BUDESONIDE AND FORMOTEROL FUMARATE DIHYDRATE 2 PUFF(S): 160; 4.5 AEROSOL RESPIRATORY (INHALATION) at 20:24

## 2019-04-02 RX ADMIN — Medication 40 MILLIGRAM(S): at 17:22

## 2019-04-02 RX ADMIN — Medication 250 MILLIGRAM(S): at 07:58

## 2019-04-02 RX ADMIN — METHOCARBAMOL 750 MILLIGRAM(S): 500 TABLET, FILM COATED ORAL at 06:06

## 2019-04-02 RX ADMIN — Medication 3 MILLILITER(S): at 13:34

## 2019-04-02 RX ADMIN — GABAPENTIN 800 MILLIGRAM(S): 400 CAPSULE ORAL at 13:13

## 2019-04-02 RX ADMIN — TRAMADOL HYDROCHLORIDE 50 MILLIGRAM(S): 50 TABLET ORAL at 16:32

## 2019-04-02 RX ADMIN — Medication 40 MILLIGRAM(S): at 05:59

## 2019-04-02 RX ADMIN — Medication 3 MILLILITER(S): at 08:28

## 2019-04-02 RX ADMIN — ESCITALOPRAM OXALATE 10 MILLIGRAM(S): 10 TABLET, FILM COATED ORAL at 11:19

## 2019-04-02 RX ADMIN — PANTOPRAZOLE SODIUM 40 MILLIGRAM(S): 20 TABLET, DELAYED RELEASE ORAL at 05:59

## 2019-04-02 RX ADMIN — Medication 60 MILLIGRAM(S): at 17:22

## 2019-04-02 RX ADMIN — BUDESONIDE AND FORMOTEROL FUMARATE DIHYDRATE 2 PUFF(S): 160; 4.5 AEROSOL RESPIRATORY (INHALATION) at 08:16

## 2019-04-02 RX ADMIN — TRAMADOL HYDROCHLORIDE 50 MILLIGRAM(S): 50 TABLET ORAL at 00:12

## 2019-04-02 RX ADMIN — Medication 3 MILLILITER(S): at 19:19

## 2019-04-02 RX ADMIN — Medication 40 MILLIGRAM(S): at 11:19

## 2019-04-02 RX ADMIN — GABAPENTIN 800 MILLIGRAM(S): 400 CAPSULE ORAL at 05:58

## 2019-04-02 RX ADMIN — Medication 2 MILLIGRAM(S): at 13:12

## 2019-04-02 RX ADMIN — TRAMADOL HYDROCHLORIDE 50 MILLIGRAM(S): 50 TABLET ORAL at 08:46

## 2019-04-02 RX ADMIN — Medication 5 MILLILITER(S): at 06:06

## 2019-04-02 RX ADMIN — TRAMADOL HYDROCHLORIDE 50 MILLIGRAM(S): 50 TABLET ORAL at 17:02

## 2019-04-02 RX ADMIN — Medication 2 MILLIGRAM(S): at 05:57

## 2019-04-02 RX ADMIN — TRAMADOL HYDROCHLORIDE 50 MILLIGRAM(S): 50 TABLET ORAL at 00:45

## 2019-04-02 RX ADMIN — CEFEPIME 100 MILLIGRAM(S): 1 INJECTION, POWDER, FOR SOLUTION INTRAMUSCULAR; INTRAVENOUS at 05:57

## 2019-04-02 RX ADMIN — TRAMADOL HYDROCHLORIDE 50 MILLIGRAM(S): 50 TABLET ORAL at 08:15

## 2019-04-02 RX ADMIN — PALIPERIDONE 1.5 MILLIGRAM(S): 1.5 TABLET, EXTENDED RELEASE ORAL at 13:16

## 2019-04-02 RX ADMIN — AZITHROMYCIN 255 MILLIGRAM(S): 500 TABLET, FILM COATED ORAL at 09:03

## 2019-04-02 RX ADMIN — HEPARIN SODIUM 5000 UNIT(S): 5000 INJECTION INTRAVENOUS; SUBCUTANEOUS at 05:59

## 2019-04-02 RX ADMIN — Medication 40 MILLIGRAM(S): at 00:12

## 2019-04-02 NOTE — PROGRESS NOTE ADULT - ASSESSMENT
1. Acute respiratory failure/COPD exacerbation: Improved  with IV steroids. Transition to PO prednisone/empiric oral antibiotics per Pulmonary. Anticipate D/C home in AM if cleared by Pulmonary  2. ILD vs pulmonary vascular congestion on CT chest: 2DECHO today  3. CBD dilatation on abdominal sonogram: MRCP as outpatient

## 2019-04-02 NOTE — PROGRESS NOTE ADULT - SUBJECTIVE AND OBJECTIVE BOX
THIS IS A DRAFT    Patient seen and examined earlier today.    Case discussed with primary team.    Complete documentation to follow.    For any question, please contact me:  Dr. Dominique Fulton  Office: [phone] ANASTACIO SIMENTAL  58y, Female  Allergy: ampicillin (Unknown)  Lithium Carbonate (Unknown)      LAST 24-Hr EVENTS:  cough sob improved  s/p iv lasix   on iv steroids  pt continues to leave the floor to go outside and smoke    VITALS:  T(F): 97.1 (04-02-19 @ 14:52), Max: 97.5 (04-01-19 @ 20:49)  HR: 96 (04-02-19 @ 14:52)  BP: 123/71 (04-02-19 @ 14:52) (123/71 - 138/76)  RR: 20 (04-02-19 @ 14:52)  SpO2: --    PHYSICAL EXAM:    GENERAL: NAD  NECK: Supple, No JVD  CHEST/LUNG: wheeze  HEART: Regular rate and rhythm  ABDOMEN: Soft, Nontender, Nondistended  EXTREMITIES:  No clubbing, edema absent        TESTS & MEASUREMENTS:                          13.3   8.17  )-----------( 185      ( 01 Apr 2019 08:35 )             40.1       04-02    136  |  98  |  34<H>  ----------------------------<  113<H>  4.8   |  23  |  0.9    Ca    10.2<H>      02 Apr 2019 07:47  Mg     2.2     04-02    TPro  7.8  /  Alb  4.4  /  TBili  0.3  /  DBili  x   /  AST  17  /  ALT  24  /  AlkPhos  185<H>  04-02    LIVER FUNCTIONS - ( 02 Apr 2019 07:47 )  Alb: 4.4 g/dL / Pro: 7.8 g/dL / ALK PHOS: 185 U/L / ALT: 24 U/L / AST: 17 U/L / GGT: x                     RADIOLOGY & ADDITIONAL TESTS:  < from: Xray Chest 1 View- PORTABLE-Routine (04.02.19 @ 08:41) >  pression:      Stable bibasilar opacities, likely atelectasis.    < end of copied text >        MEDICATIONS:  MEDICATIONS  (STANDING):  ALBUTerol/ipratropium for Nebulization 3 milliLiter(s) Nebulizer every 6 hours  ALPRAZolam 1 milliGRAM(s) Oral at bedtime  buDESOnide 160 MICROgram(s)/formoterol 4.5 MICROgram(s) Inhaler 2 Puff(s) Inhalation two times a day  chlorhexidine 4% Liquid 1 Application(s) Topical <User Schedule>  clonazePAM Tablet 2 milliGRAM(s) Oral every 8 hours  escitalopram 10 milliGRAM(s) Oral daily  gabapentin 800 milliGRAM(s) Oral three times a day  heparin  Injectable 5000 Unit(s) SubCutaneous every 8 hours  methylPREDNISolone sodium succinate Injectable 60 milliGRAM(s) IV Push every 12 hours  paliperidone ER. 1.5 milliGRAM(s) Oral daily  pantoprazole    Tablet 40 milliGRAM(s) Oral before breakfast    MEDICATIONS  (PRN):  acetaminophen   Tablet .. 650 milliGRAM(s) Oral every 6 hours PRN Mild Pain (1 - 3)  guaiFENesin/dextromethorphan  Syrup 5 milliLiter(s) Oral every 8 hours PRN Cough  methocarbamol 750 milliGRAM(s) Oral every 8 hours PRN muscle pain  simethicone 80 milliGRAM(s) Chew every 8 hours PRN Dyspepsia  traMADol 50 milliGRAM(s) Oral every 8 hours PRN Severe Pain (7 - 10)        < from: Transthoracic Echocardiogram (04.02.19 @ 14:52) >  Summary:   1. LV Ejection Fraction by Vazquez's Method with a biplane EF of 75 %.   2. Normal left ventricular size and wall thicknesses, with normal   systolic function.   3. The mean global longitudinal strain by speckle tracking is -16.8%   which is borderline.   4. Mild tricuspid regurgitation.   5. LA volume Index is 27.3 ml/m² ml/m2.    < end of copied text >

## 2019-04-02 NOTE — PROGRESS NOTE ADULT - SUBJECTIVE AND OBJECTIVE BOX
ANASTACIO SIMENTAL  58y, Female      OVERNIGHT EVENTS:  afebrile  WBC 8    ROS negative except as per above    VITALS:  T(F): 96.3, Max: 97.5 (04-01-19 @ 20:49)  HR: 67  BP: 134/76  RR: 18Vital Signs Last 24 Hrs  T(C): 35.7 (02 Apr 2019 04:48), Max: 36.4 (01 Apr 2019 20:49)  T(F): 96.3 (02 Apr 2019 04:48), Max: 97.5 (01 Apr 2019 20:49)  HR: 67 (02 Apr 2019 04:48) (67 - 73)  BP: 134/76 (02 Apr 2019 04:48) (119/68 - 138/76)  BP(mean): --  RR: 18 (02 Apr 2019 04:48) (18 - 18)  SpO2: --    PHYSICAL EXAM  ***    TESTS & MEASUREMENTS:                        13.3   8.17  )-----------( 185      ( 01 Apr 2019 08:35 )             40.1     04-02    136  |  98  |  34<H>  ----------------------------<  113<H>  4.8   |  23  |  0.9    Ca    10.2<H>      02 Apr 2019 07:47  Mg     2.2     04-02    TPro  7.8  /  Alb  4.4  /  TBili  0.3  /  DBili  x   /  AST  17  /  ALT  24  /  AlkPhos  185<H>  04-02    LIVER FUNCTIONS - ( 02 Apr 2019 07:47 )  Alb: 4.4 g/dL / Pro: 7.8 g/dL / ALK PHOS: 185 U/L / ALT: 24 U/L / AST: 17 U/L / GGT: x                 RADIOLOGY & ADDITIONAL TESTS:    ANTIBIOTICS:  azithromycin  IVPB   255 mL/Hr IV Intermittent (04-02-19 @ 09:03)   255 mL/Hr IV Intermittent (04-01-19 @ 12:04)    azithromycin  IVPB   255 mL/Hr IV Intermittent (03-30-19 @ 13:16)    cefepime   IVPB   100 mL/Hr IV Intermittent (03-30-19 @ 20:40)    cefepime   IVPB   100 mL/Hr IV Intermittent (04-02-19 @ 05:57)   100 mL/Hr IV Intermittent (04-01-19 @ 22:23)   100 mL/Hr IV Intermittent (04-01-19 @ 15:13)   100 mL/Hr IV Intermittent (04-01-19 @ 05:56)   100 mL/Hr IV Intermittent (03-31-19 @ 21:39)   100 mL/Hr IV Intermittent (03-31-19 @ 13:20)   100 mL/Hr IV Intermittent (03-31-19 @ 06:11)    cefTRIAXone   IVPB   100 mL/Hr IV Intermittent (03-30-19 @ 12:35)    vancomycin  IVPB   250 mL/Hr IV Intermittent (04-02-19 @ 07:58)   250 mL/Hr IV Intermittent (04-01-19 @ 18:56)   250 mL/Hr IV Intermittent (04-01-19 @ 05:59)   250 mL/Hr IV Intermittent (03-31-19 @ 17:35)   250 mL/Hr IV Intermittent (03-31-19 @ 06:11)   250 mL/Hr IV Intermittent (03-30-19 @ 20:39)        azithromycin  IVPB 500 milliGRAM(s) IV Intermittent every 24 hours  cefepime   IVPB      cefepime   IVPB 2000 milliGRAM(s) IV Intermittent every 8 hours ANASTACIO SIMENTAL  58y, Female      OVERNIGHT EVENTS:  afebrile  WBC 8    ROS negative except as per above    VITALS:  T(F): 96.3, Max: 97.5 (04-01-19 @ 20:49)  HR: 67  BP: 134/76  RR: 18Vital Signs Last 24 Hrs  T(C): 35.7 (02 Apr 2019 04:48), Max: 36.4 (01 Apr 2019 20:49)  T(F): 96.3 (02 Apr 2019 04:48), Max: 97.5 (01 Apr 2019 20:49)  HR: 67 (02 Apr 2019 04:48) (67 - 73)  BP: 134/76 (02 Apr 2019 04:48) (119/68 - 138/76)  BP(mean): --  RR: 18 (02 Apr 2019 04:48) (18 - 18)  SpO2: --    PHYSICAL EXAM  Gen: Awake and alert, non-toxic appearing, NAD  HEENT: NCAT. EOMI. MMM.   Neck: Supple, no cervical LAD  CV: RRR  Lungs: wheezes  Abd: Soft. NTND  Extr: wwp, no edema  Skin: no rash  Neuro: No focal deficits  Lines: clean      TESTS & MEASUREMENTS:                        13.3   8.17  )-----------( 185      ( 01 Apr 2019 08:35 )             40.1     04-02    136  |  98  |  34<H>  ----------------------------<  113<H>  4.8   |  23  |  0.9    Ca    10.2<H>      02 Apr 2019 07:47  Mg     2.2     04-02    TPro  7.8  /  Alb  4.4  /  TBili  0.3  /  DBili  x   /  AST  17  /  ALT  24  /  AlkPhos  185<H>  04-02    LIVER FUNCTIONS - ( 02 Apr 2019 07:47 )  Alb: 4.4 g/dL / Pro: 7.8 g/dL / ALK PHOS: 185 U/L / ALT: 24 U/L / AST: 17 U/L / GGT: x                 RADIOLOGY & ADDITIONAL TESTS:    ANTIBIOTICS:  azithromycin  IVPB   255 mL/Hr IV Intermittent (04-02-19 @ 09:03)   255 mL/Hr IV Intermittent (04-01-19 @ 12:04)    azithromycin  IVPB   255 mL/Hr IV Intermittent (03-30-19 @ 13:16)    cefepime   IVPB   100 mL/Hr IV Intermittent (03-30-19 @ 20:40)    cefepime   IVPB   100 mL/Hr IV Intermittent (04-02-19 @ 05:57)   100 mL/Hr IV Intermittent (04-01-19 @ 22:23)   100 mL/Hr IV Intermittent (04-01-19 @ 15:13)   100 mL/Hr IV Intermittent (04-01-19 @ 05:56)   100 mL/Hr IV Intermittent (03-31-19 @ 21:39)   100 mL/Hr IV Intermittent (03-31-19 @ 13:20)   100 mL/Hr IV Intermittent (03-31-19 @ 06:11)    cefTRIAXone   IVPB   100 mL/Hr IV Intermittent (03-30-19 @ 12:35)    vancomycin  IVPB   250 mL/Hr IV Intermittent (04-02-19 @ 07:58)   250 mL/Hr IV Intermittent (04-01-19 @ 18:56)   250 mL/Hr IV Intermittent (04-01-19 @ 05:59)   250 mL/Hr IV Intermittent (03-31-19 @ 17:35)   250 mL/Hr IV Intermittent (03-31-19 @ 06:11)   250 mL/Hr IV Intermittent (03-30-19 @ 20:39)        azithromycin  IVPB 500 milliGRAM(s) IV Intermittent every 24 hours  cefepime   IVPB      cefepime   IVPB 2000 milliGRAM(s) IV Intermittent every 8 hours

## 2019-04-02 NOTE — PROGRESS NOTE ADULT - SUBJECTIVE AND OBJECTIVE BOX
ANASTACIO SIMENTAL  58y  Female      Patient is a 58y old  Female who presents with a chief complaint of SOB (02 Apr 2019 08:02)      INTERVAL HPI/OVERNIGHT EVENTS: Feeling better, still smoking      REVIEW OF SYSTEMS:  CONSTITUTIONAL: No fever, weight loss, or fatigue  EYES: No eye pain, visual disturbances, or discharge  ENMT:  No difficulty hearing, tinnitus, vertigo; No sinus or throat pain  NECK: No pain or stiffness  BREASTS: No pain, masses, or nipple discharge  RESPIRATORY: Productive cough, wheezing, no chills or hemoptysis; Shortness of breath on exertion improved  CARDIOVASCULAR: No chest pain, palpitations, dizziness, or leg swelling  GASTROINTESTINAL: No abdominal or epigastric pain. No nausea, vomiting, or hematemesis; No diarrhea or constipation. No melena or hematochezia.  GENITOURINARY: No dysuria, frequency, hematuria, or incontinence  NEUROLOGICAL: No headaches, memory loss, loss of strength, numbness, or tremors  SKIN: No itching, burning, rashes, or lesions   LYMPH NODES: No enlarged glands  ENDOCRINE: No heat or cold intolerance; No hair loss  MUSCULOSKELETAL: No joint pain or swelling; No muscle, back, or extremity pain  PSYCHIATRIC: No depression, anxiety, mood swings, or difficulty sleeping  HEME/LYMPH: No easy bruising, or bleeding gums  ALLERY AND IMMUNOLOGIC: No hives or eczema    T(C): 35.7 (04-02-19 @ 04:48), Max: 36.4 (04-01-19 @ 20:49)  HR: 67 (04-02-19 @ 04:48) (67 - 73)  BP: 134/76 (04-02-19 @ 04:48) (119/68 - 138/76)  RR: 18 (04-02-19 @ 04:48) (18 - 18)  SpO2: 95% (04-01-19 @ 08:41) (95% - 95%)  Wt(kg): --Vital Signs Last 24 Hrs  T(C): 35.7 (02 Apr 2019 04:48), Max: 36.4 (01 Apr 2019 20:49)  T(F): 96.3 (02 Apr 2019 04:48), Max: 97.5 (01 Apr 2019 20:49)  HR: 67 (02 Apr 2019 04:48) (67 - 73)  BP: 134/76 (02 Apr 2019 04:48) (119/68 - 138/76)  BP(mean): --  RR: 18 (02 Apr 2019 04:48) (18 - 18)  SpO2: 95% (01 Apr 2019 08:41) (95% - 95%)    PHYSICAL EXAM:  GENERAL: NAD, well-groomed, well-developed  HEAD:  Atraumatic, Normocephalic  EYES: EOMI, PERRLA, conjunctiva and sclera clear  ENMT: No tonsillar erythema, exudates, or enlargement; Moist mucous membranes, Good dentition, No lesions  NECK: Supple, No JVD, Normal thyroid  NERVOUS SYSTEM:  Alert & Oriented X3, Good concentration; Motor Strength 5/5 B/L upper and lower extremities; DTRs 2+ intact and symmetric  CHEST/LUNG: Bilateral crackles and expiratory wheezing improved  HEART: Regular rate and rhythm; No murmurs, rubs, or gallops  ABDOMEN: Soft, Nontender, Nondistended; Bowel sounds present  EXTREMITIES:  2+ Peripheral Pulses, No clubbing, cyanosis, or edema  LYMPH: No lymphadenopathy noted  SKIN: No rashes or lesions    Consultant(s) Notes Reviewed:  [x ] YES  [ ] NO    Discussed with Consultants/Other Providers [ x] YES     LABS                         13.3   8.17  )-----------( 185      ( 01 Apr 2019 08:35 )             40.1   04-01    136  |  101  |  28<H>  ----------------------------<  102<H>  5.3<H>   |  22  |  0.8    Ca    9.6      01 Apr 2019 18:48  Mg     2.0     04-01    MEDICATIONS  (STANDING):  ALBUTerol/ipratropium for Nebulization 3 milliLiter(s) Nebulizer every 6 hours  ALPRAZolam 1 milliGRAM(s) Oral at bedtime  azithromycin  IVPB 500 milliGRAM(s) IV Intermittent every 24 hours  buDESOnide 160 MICROgram(s)/formoterol 4.5 MICROgram(s) Inhaler 2 Puff(s) Inhalation two times a day  cefepime   IVPB      cefepime   IVPB 2000 milliGRAM(s) IV Intermittent every 8 hours  chlorhexidine 4% Liquid 1 Application(s) Topical <User Schedule>  clonazePAM Tablet 2 milliGRAM(s) Oral every 8 hours  escitalopram 10 milliGRAM(s) Oral daily  gabapentin 800 milliGRAM(s) Oral three times a day  heparin  Injectable 5000 Unit(s) SubCutaneous every 8 hours  methylPREDNISolone sodium succinate Injectable 60 milliGRAM(s) IV Push every 12 hours  paliperidone ER. 1.5 milliGRAM(s) Oral daily  pantoprazole    Tablet 40 milliGRAM(s) Oral before breakfast    MEDICATIONS  (PRN):  acetaminophen   Tablet .. 650 milliGRAM(s) Oral every 6 hours PRN Mild Pain (1 - 3)  guaiFENesin/dextromethorphan  Syrup 5 milliLiter(s) Oral every 8 hours PRN Cough  methocarbamol 750 milliGRAM(s) Oral every 8 hours PRN muscle pain  simethicone 80 milliGRAM(s) Chew every 8 hours PRN Dyspepsia  traMADol 50 milliGRAM(s) Oral every 8 hours PRN Severe Pain (7 - 10)        RADIOLOGY & ADDITIONAL TESTS:    Imaging Personally Reviewed:  [ ] YES  [ ] NO    HEALTH ISSUES - PROBLEM Dx:

## 2019-04-02 NOTE — PROGRESS NOTE ADULT - SUBJECTIVE AND OBJECTIVE BOX
ANASTACIO SIMENTAL 58y Female  MRN#: [id number]   CODE STATUS:___DNR/DNI_____      SUBJECTIVE  Patient is a 58y old Female who presents with a chief complaint of SOB (01 Apr 2019 06:32)  Currently admitted to medicine with the primary diagnosis of Chronic obstructive pulmonary disease with acute exacerbation  Today is hospital day 3d, and this morning she is       Patient leaves unit regularly for coffee and cigarettes still, despite repeated complaints of fatigue and SOB.  Abd noted to be distended, follows w/ hepatologist in [place]/[facility]. States hep C in remission.         OBJECTIVE  PAST MEDICAL & SURGICAL HISTORY  PVD (peripheral vascular disease)  Asthma  Bipolar affective  Hepatitis C  COPD (chronic obstructive pulmonary disease)  No significant past surgical history    ALLERGIES:  ampicillin (Unknown)  Lithium Carbonate (Unknown)    MEDICATIONS:  STANDING MEDICATIONS  ALBUTerol/ipratropium for Nebulization 3 milliLiter(s) Nebulizer every 6 hours  ALPRAZolam 1 milliGRAM(s) Oral at bedtime  azithromycin  IVPB 500 milliGRAM(s) IV Intermittent every 24 hours  buDESOnide 160 MICROgram(s)/formoterol 4.5 MICROgram(s) Inhaler 2 Puff(s) Inhalation two times a day  cefepime   IVPB      cefepime   IVPB 2000 milliGRAM(s) IV Intermittent every 8 hours  chlorhexidine 4% Liquid 1 Application(s) Topical <User Schedule>  clonazePAM Tablet 2 milliGRAM(s) Oral every 8 hours  escitalopram 10 milliGRAM(s) Oral daily  gabapentin 800 milliGRAM(s) Oral three times a day  heparin  Injectable 5000 Unit(s) SubCutaneous every 8 hours  methylPREDNISolone sodium succinate Injectable 40 milliGRAM(s) IV Push every 12 hours  paliperidone ER. 1.5 milliGRAM(s) Oral daily  pantoprazole    Tablet 40 milliGRAM(s) Oral before breakfast    PRN MEDICATIONS  acetaminophen   Tablet .. 650 milliGRAM(s) Oral every 6 hours PRN  guaiFENesin/dextromethorphan  Syrup 5 milliLiter(s) Oral every 8 hours PRN  methocarbamol 750 milliGRAM(s) Oral every 8 hours PRN  simethicone 80 milliGRAM(s) Chew every 8 hours PRN  traMADol 50 milliGRAM(s) Oral every 8 hours PRN      VITAL SIGNS: Last 24 Hours  T(C): 35.7 (02 Apr 2019 04:48), Max: 36.4 (01 Apr 2019 20:49)  T(F): 96.3 (02 Apr 2019 04:48), Max: 97.5 (01 Apr 2019 20:49)  HR: 67 (02 Apr 2019 04:48) (67 - 73)  BP: 134/76 (02 Apr 2019 04:48) (119/68 - 138/76)  BP(mean): --  RR: 18 (02 Apr 2019 04:48) (18 - 18)  SpO2: 95% (01 Apr 2019 08:41) (95% - 95%)    LABS:                        13.3   8.17  )-----------( 185      ( 01 Apr 2019 08:35 )             40.1     04-01    136  |  101  |  28<H>  ----------------------------<  102<H>  5.3<H>   |  22  |  0.8    Ca    9.6      01 Apr 2019 18:48  Mg     2.0     04-01        RADIOLOGY:  < from: Xray Ribs 2 Views, Left (04.01.19 @ 11:31) >  IMPRESSION:     No displaced rib fracture. No pneumothorax.    < end of copied text >  < from: US Abdomen Limited (04.01.19 @ 12:21) >  IMPRESSION:    Nonvisualization of the gallbladder which is collapsed.    CBD dilatation (9 mm). Recommend laboratory correlation, and if   indicated, MRCP can be obtained to exclude CBD obstruction.    < end of copied text >      PHYSICAL EXAM:    GENERAL: NAD, well-developed lady off O2, no resp distress, in good spirits, AAOx3  HEENT:  Atraumatic, Normocephalic. EOMI, PERRLA, anicteric  PULMONARY: wheezing on expiration, no areas of dullness, dry cough; point tenderness over lower anterior L ribs 7-10  CARDIOVASCULAR: Regular rate and rhythm; No murmurs  GASTROINTESTINAL: Soft, Nontender, mildly distended, mildly tense, some fluid wave; Bowel sounds present, tympanic  EXT:  2+ Peripheral Pulses, trace edema of ankles  SKIN: No rashes       ADMISSION SUMMARY  Patient is a 58y old Female who presents with a chief complaint of SOB (30 Mar 2019 18:33)  Currently admitted to medicine with the primary diagnosis of Chronic obstructive pulmonary disease with acute exacerbation  58 Year Old Female PMH of COPD not on home oxygen, PVD s/p stent, Hep C, Bipolar Disorder, current smoker (3cig/day) presents with shortness of breath and cough. Pt states that she was admitted to the hospital on 3/6 and d/c'ed home on 3/9 for COPD exacerbation and pneumonia (noted with RML infiltrate on initial cxr). Pt was treated with prednisone/solumedrol and abx as inpatient and was d/c'ed home with prednisone. She completed course of prednisone but continues to have productive cough, wheezing, dyspnea with exertion and intermittent fever. Pt stated that she had a fever ptp [id number], and was using Symbicort albuterol inhaler (4-5x per day) and nebulizer (2x per day), with no improvement of sx. Pt was not d/c'ed home with abx and has not followed with pulmonology since d/c. She denies any ha, neck pain, chest pain, abdominal pain, n/v/d, leg swelling. No recent travel or sick contacts. Pt did not get flu shot this year but did get pneumonia vaccine. Pt is still smoking 1/2 ppd. She has been intubated in the past, last time was 6 years ago.       ASSESSMENT & PLAN    #SOB likely 2/2 Pneumonia with underlying COPD Exacerbation   - as per pt, breathing status has steadily worsened over past few years, w/ increased QUARLES, but SaO2 ambulating 93%  - c/w Symbicort and Albuterol   - c/w Cefepime, Azithromycin  - f/u urine streg/legionella , MRSA neg (d/c vanc)  - ID consult appreciated  - Pulm recs appreciated  - will inc solumedrol to 60 IV BID  - gave lasix 40 IV once  - f/u Echo  - guaifenasin/dextromorphan for cough    #L Rib pain, likely MSK  - f/u rib series, neg for fx  - c/w muscle relaxant  #Hx of Hep C  - as per pt, chronic in remission for 10+yrs   - US abd: distended CBD  - f/u LFT (ALP noted elevated, but no abd pain, palpable GB)    #Neuropathic Pain   - c/w Gabapentin 80 three times daily.     #Bipolar Disorder   - c/w klonapin, xanax, lexapro     #MISC:   - DVT Prophylaxis: Heparin Sub q   - GI ppx: PPI  - from home  - DNR/DNI    (x) Discussion with patient and/or family regarding goals of care  (x) Discussed Case and Plan with Medical Attending, Name: Dale Iyer note reviewed ANASTACIO SIMENTAL 58y Female  MRN#: [id number]   CODE STATUS:___DNR/DNI_____      SUBJECTIVE  Patient is a 58y old Female who presents with a chief complaint of SOB (01 Apr 2019 06:32)  Currently admitted to medicine with the primary diagnosis of Chronic obstructive pulmonary disease with acute exacerbation  Today is hospital day 3d, and this morning she is feeling back to baseline, wants to go home. states her pain is much improved, no longer fatigued or SOB.       Patient leaves unit regularly for coffee and cigarettes still, despite repeated complaints of fatigue and SOB.  Abd noted to be distended, follows w/ hepatologist in [place]. States hep C in remission.         OBJECTIVE  PAST MEDICAL & SURGICAL HISTORY  PVD (peripheral vascular disease)  Asthma  Bipolar affective  Hepatitis C  COPD (chronic obstructive pulmonary disease)  No significant past surgical history    ALLERGIES:  ampicillin (Unknown)  Lithium Carbonate (Unknown)    MEDICATIONS:  STANDING MEDICATIONS  ALBUTerol/ipratropium for Nebulization 3 milliLiter(s) Nebulizer every 6 hours  ALPRAZolam 1 milliGRAM(s) Oral at bedtime  azithromycin  IVPB 500 milliGRAM(s) IV Intermittent every 24 hours  buDESOnide 160 MICROgram(s)/formoterol 4.5 MICROgram(s) Inhaler 2 Puff(s) Inhalation two times a day  cefepime   IVPB      cefepime   IVPB 2000 milliGRAM(s) IV Intermittent every 8 hours  chlorhexidine 4% Liquid 1 Application(s) Topical <User Schedule>  clonazePAM Tablet 2 milliGRAM(s) Oral every 8 hours  escitalopram 10 milliGRAM(s) Oral daily  gabapentin 800 milliGRAM(s) Oral three times a day  heparin  Injectable 5000 Unit(s) SubCutaneous every 8 hours  methylPREDNISolone sodium succinate Injectable 40 milliGRAM(s) IV Push every 12 hours  paliperidone ER. 1.5 milliGRAM(s) Oral daily  pantoprazole    Tablet 40 milliGRAM(s) Oral before breakfast    PRN MEDICATIONS  acetaminophen   Tablet .. 650 milliGRAM(s) Oral every 6 hours PRN  guaiFENesin/dextromethorphan  Syrup 5 milliLiter(s) Oral every 8 hours PRN  methocarbamol 750 milliGRAM(s) Oral every 8 hours PRN  simethicone 80 milliGRAM(s) Chew every 8 hours PRN  traMADol 50 milliGRAM(s) Oral every 8 hours PRN      VITAL SIGNS: Last 24 Hours  T(C): 35.7 (02 Apr 2019 04:48), Max: 36.4 (01 Apr 2019 20:49)  T(F): 96.3 (02 Apr 2019 04:48), Max: 97.5 (01 Apr 2019 20:49)  HR: 67 (02 Apr 2019 04:48) (67 - 73)  BP: 134/76 (02 Apr 2019 04:48) (119/68 - 138/76)  BP(mean): --  RR: 18 (02 Apr 2019 04:48) (18 - 18)  SpO2: 95% (01 Apr 2019 08:41) (95% - 95%)    LABS:                        13.3   8.17  )-----------( 185      ( 01 Apr 2019 08:35 )             40.1     04-01    136  |  101  |  28<H>  ----------------------------<  102<H>  5.3<H>   |  22  |  0.8    Ca    9.6      01 Apr 2019 18:48  Mg     2.0     04-01        RADIOLOGY:  < from: Xray Ribs 2 Views, Left (04.01.19 @ 11:31) >  IMPRESSION:     No displaced rib fracture. No pneumothorax.    < end of copied text >  < from: US Abdomen Limited (04.01.19 @ 12:21) >  IMPRESSION:    Nonvisualization of the gallbladder which is collapsed.    CBD dilatation (9 mm). Recommend laboratory correlation, and if   indicated, MRCP can be obtained to exclude CBD obstruction.    < end of copied text >      PHYSICAL EXAM:    GENERAL: NAD, well-developed lady off O2, no resp distress, in good spirits, AAOx3  HEENT:  Atraumatic, Normocephalic. EOMI, PERRLA  PULMONARY: wheezing on expiration (possibly baseline), dry cough, improved tenderness over ribs  CARDIOVASCULAR: Regular rate and rhythm; No murmurs  GASTROINTESTINAL: Soft, Nontender, mildly distended, mildly tense, Bowel sounds present, tympanic  EXT:  2+ Peripheral Pulses, trace edema of ankles  SKIN: No rashes       ADMISSION SUMMARY  Patient is a 58y old Female who presents with a chief complaint of SOB (30 Mar 2019 18:33)  Currently admitted to medicine with the primary diagnosis of Chronic obstructive pulmonary disease with acute exacerbation  58 Year Old Female PMH of COPD not on home oxygen, PVD s/p stent, Hep C, Bipolar Disorder, current smoker (3cig/day) presents with shortness of breath and cough. Pt states that she was admitted to the hospital on 3/6 and d/c'ed home on 3/9 for COPD exacerbation and pneumonia (noted with RML infiltrate on initial cxr). Pt was treated with prednisone/solumedrol and abx as inpatient and was d/c'ed home with prednisone. She completed course of prednisone but continues to have productive cough, wheezing, dyspnea with exertion and intermittent fever. Pt stated that she had a fever ptp [id number], and was using Symbicort albuterol inhaler (4-5x per day) and nebulizer (2x per day), with no improvement of sx. Pt was not d/c'ed home with abx and has not followed with pulmonology since d/c. She denies any ha, neck pain, chest pain, abdominal pain, n/v/d, leg swelling. No recent travel or sick contacts. Pt did not get flu shot this year but did get pneumonia vaccine. Pt is still smoking 1/2 ppd. She has been intubated in the past, last time was 6 years ago.       ASSESSMENT & PLAN    #SOB likely 2/2 Pneumonia with underlying COPD Exacerbation   - as per pt, breathing status has steadily worsened over past few years, w/ increased QUARLES, but SaO2 ambulating 93%  - c/w Symbicort and Albuterol   - c/w Cefepime, Azithromycin  - f/u urine streg/legionella , MRSA neg (d/c vanc)  - ID consult appreciated  - Pulm recs appreciated  - IV solumedrol today, pred taper tmw, lasix 40 BID IV today, dc on PO lasix 20  - f/u Echo (r/o underlying HF)  - guaifenasin/dextromorphan for cough    #L Rib pain, likely MSK  - f/u rib series, neg for fx  - c/w muscle relaxant  #Hx of Hep C  - as per pt, chronic in remission for 10+yrs   - US abd: distended CBD  - MRCP / ERCP further eval either with [place] [facility] or w/ Dr. Shireen     #Neuropathic Pain   - c/w Gabapentin 800 three times daily.     #Bipolar Disorder   - c/w klonapin, xanax, lexapro    #MISC:   - DVT Prophylaxis: Heparin Sub q   - GI ppx: PPI  - from home  - DNR/DNI    (x) Discussion with patient and/or family regarding goals of care  (x) Discussed Case and Plan with Medical Attending, Name: Dale / Shireen note reviewed

## 2019-04-02 NOTE — DISCHARGE NOTE PROVIDER - HOSPITAL COURSE
58 Year Old Female PMH of COPD not on home oxygen, PVD s/p stent, Hep C, Bipolar Disorder, current smoker (3cig/day) presents with shortness of breath and cough. Pt states that she was admitted to the hospital on 3/6 and d/c'ed home on 3/9 for COPD exacerbation and pneumonia (noted with RML infiltrate on initial cxr). Pt was treated with prednisone/solumedrol and abx as inpatient and was d/c'ed home with prednisone. She completed course of prednisone but continues to have productive cough, wheezing, dyspnea with exertion and intermittent fever. Pt stated that she had a fever ptp [id number], and was using Symbicort albuterol inhaler (4-5x per day) and nebulizer (2x per day), with no improvement of sx. Pt was not d/c'ed home with abx and has not followed with pulmonology since d/c. She denies any ha, neck pain, chest pain, abdominal pain, n/v/d, leg swelling. No recent travel or sick contacts. Pt did not get flu shot this year but did get pneumonia vaccine. Pt is still smoking 1/2 ppd. She has been intubated in the past, last time was 6 years ago.         There was suspicion for COPD exacerbation with possible PNA superimposed. Patient got 3d IV abbx but ID eventually discontinued. Patient improved with steroids and IV lasix for more likely COPD exacerbation. Concern for possible underlying cardiac disease given pt history so Echo was ordered. Patient discharged on steroid taper and PO lasix for now.

## 2019-04-02 NOTE — DISCHARGE NOTE PROVIDER - NSDCFUADDINST_GEN_ALL_CORE_FT
Please stop smoking, you are so close to quitting and this will greatly improve your lung care and cardiac status. Please stop smoking, you are so close to quitting and this will greatly improve your lung care and cardiac status.    - Follow up with Dr. Fulton in 1 week, continue with lasix and please discuss with Dr. Fulton/Shireen when to discontinue  - Start prednisone taper TOMORROW 4/4  - Follow up with Dr. Iyer in 1 week

## 2019-04-02 NOTE — DISCHARGE NOTE PROVIDER - CARE PROVIDER_API CALL
Dominique Fulton)  Internal Medicine  [address] [place] [address]  Phone: [phone]  Fax: [phone]  Follow Up Time:     Holland Iyer)  Internal Medicine  [address] [address]  Phone: [phone]  Fax: [phone]  Follow Up Time:

## 2019-04-02 NOTE — DISCHARGE NOTE PROVIDER - NSDCCPCAREPLAN_GEN_ALL_CORE_FT
PRINCIPAL DISCHARGE DIAGNOSIS  Diagnosis: Chronic obstructive pulmonary disease with acute exacerbation  Assessment and Plan of Treatment: You have COPD likely secondary to smoking. Please STOP SMOKING as this worsens your condition and puts you at higher risk for both lung and heart conditions.  - You were given nebulizer treatments and IV steroids and you improved.  - You also had an echocardiogram to rule out underlying cardiac disease  - Upon discharge please complete prednisone taper 60mg for 3 days, 40mg for 3 days, then 20mg for 3 days. In the next week please make an appointment for Dr. uFlton your lung doctor.  - Continue with lasix 20mg daily until you see your lung doctor.  - No further antibiotics as this time.      SECONDARY DISCHARGE DIAGNOSES  Diagnosis: Hepatitis C  Assessment and Plan of Treatment: History of hepatitis C chronic, followed in [facility].    Diagnosis: Pneumonia of right middle lobe due to infectious organism  Assessment and Plan of Treatment: Possible pneumonia treated with IV antibiotics and patient improved.  - On room air, saturating well. No further fevers.   - No antibiotics needed at this time for discharge.   - Please follow up with Dr. Fulton and your primary provider. PRINCIPAL DISCHARGE DIAGNOSIS  Diagnosis: Chronic obstructive pulmonary disease with acute exacerbation  Assessment and Plan of Treatment: You have COPD likely secondary to smoking. Please STOP SMOKING as this worsens your condition and puts you at higher risk for both lung and heart conditions.  - You were given nebulizer treatments and IV steroids and you improved.  - You also had an echocardiogram to rule out underlying cardiac disease  - Upon discharge please complete prednisone taper 60mg for 3 days, 40mg for 3 days, then 20mg for 3 days. In the next week please make an appointment for Dr. Fulton your lung doctor.  - Continue with lasix 20mg daily until you see your lung doctor.  - No further antibiotics as this time.      SECONDARY DISCHARGE DIAGNOSES  Diagnosis: Hepatitis C  Assessment and Plan of Treatment: History of hepatitis C chronic, followed in [facility]. While you were here, you had an ultrasound that showed dilated common bilary duct.   - This should be followed up by your primary provider with a MRCP (MRI of the gallbladder/biliary tree) or other test as indicated.   - Given your history, you should seek follow up for your liver.    Diagnosis: Pneumonia of right middle lobe due to infectious organism  Assessment and Plan of Treatment: Possible pneumonia treated with IV antibiotics and patient improved.  - On room air, saturating well. No further fevers.   - No antibiotics needed at this time for discharge.   - Please follow up with Dr. Fulton and your primary provider. PRINCIPAL DISCHARGE DIAGNOSIS  Diagnosis: Chronic obstructive pulmonary disease with acute exacerbation  Assessment and Plan of Treatment: You have COPD likely secondary to smoking. Please STOP SMOKING as this worsens your condition and puts you at higher risk for both lung and heart conditions.  - You were given nebulizer treatments and IV steroids and you improved.  - You also had an echocardiogram to rule out underlying cardiac disease which showed good cardiac function.   - Upon discharge please complete prednisone taper 60mg for 3 days, 40mg for 3 days, then 20mg for 3 days.   - Continue with lasix 20mg daily until you see your lung doctor.  - No further antibiotics as this time.  Follow up with Dr. Fulton in 1 week.  Follow up with Dr. Iyer in 1 week. [phone]      SECONDARY DISCHARGE DIAGNOSES  Diagnosis: Hepatitis C  Assessment and Plan of Treatment: History of hepatitis C chronic, followed in [facility]. While you were here, you had an ultrasound that showed dilated common bilary duct.   - This should be followed up by your primary provider with a MRCP (MRI of the gallbladder/biliary tree) or other test as indicated.   - Given your history, you should seek follow up for your liver.    Diagnosis: Pneumonia of right middle lobe due to infectious organism  Assessment and Plan of Treatment: Possible pneumonia treated with IV antibiotics and patient improved.  - On room air, saturating well. No further fevers.   - No antibiotics needed at this time for discharge.   - Please follow up with Dr. Fulton and your primary provider.

## 2019-04-02 NOTE — PROGRESS NOTE ADULT - ASSESSMENT
Acute respiratory failure-hypoxic improving  Copd exacerabtion  Abnormal Ct Chest/ILD verse Pulmonary edema   Doubt Pneumona  Continuos tobacco abuse/Smoking cessation counseling      02 via nc  d/c iv steroids in am  po prednisone 60 mg with taper by 20 mg every 3 days then stop  symbicort 160 mg 2 puffs bid  d/c lasix  agree with d/cing abx  probnp elevated  echo reviewed  s/p iv lasix- no need to continue  repeat ct imaging as out pt  avoid volume overload  smoking cessation counseling reinforced - pt continuously leaves medical floor to smoke   dvt/gi px  anticipated d/c in am  d/w with house staff  ambulate as tolerated

## 2019-04-02 NOTE — PROGRESS NOTE ADULT - ASSESSMENT
58yF    PVD  Asthma/COPD  Bipolar affective  Hepatitis C  ampicillin (Unknown)    Pt admitted with hx T101 (no fever here) with suspected COPD exacerbation; CXR bibasilar opacities  No wbc  Sepsis ruled out on admission   FLU & RSV all negative  3/8 CT chest with Groundglass opacifications as described in a crazy paving type distribution. While findings may be indicative of small airways disease, other entities are certainly within the differential including different types of hypersensitivity pneumonitis.   MRSA PCR neg  US CBD dilatation    SUGGESTIONs  DC cefepime/azithro  Pulmonary following: on steroids  CBD eval 58yF    PVD  Asthma/COPD  Bipolar affective  Hepatitis C  ampicillin (Unknown)    Pt admitted with hx T101 (no fever here) with suspected viral PNA (+sore throat, rhinorrhea) and COPD exacerbation; CXR bibasilar opacities  No wbc  Sepsis ruled out on admission   FLU & RSV all negative  3/8 CT chest with Groundglass opacifications as described in a crazy paving type distribution. While findings may be indicative of small airways disease, other entities are certainly within the differential including different types of hypersensitivity pneumonitis.   MRSA PCR neg  US CBD dilatation    SUGGESTIONs  DC cefepime/azithro  Pulmonary following: on steroids  CBD eval

## 2019-04-03 ENCOUNTER — TRANSCRIPTION ENCOUNTER (OUTPATIENT)
Age: 59
End: 2019-04-03

## 2019-04-03 VITALS
HEART RATE: 95 BPM | SYSTOLIC BLOOD PRESSURE: 152 MMHG | TEMPERATURE: 99 F | RESPIRATION RATE: 19 BRPM | DIASTOLIC BLOOD PRESSURE: 73 MMHG

## 2019-04-03 LAB — S PNEUM AG UR QL: NEGATIVE — SIGNIFICANT CHANGE UP

## 2019-04-03 RX ADMIN — Medication 60 MILLIGRAM(S): at 06:03

## 2019-04-03 RX ADMIN — GABAPENTIN 800 MILLIGRAM(S): 400 CAPSULE ORAL at 06:02

## 2019-04-03 RX ADMIN — Medication 20 MILLIGRAM(S): at 06:04

## 2019-04-03 RX ADMIN — BUDESONIDE AND FORMOTEROL FUMARATE DIHYDRATE 2 PUFF(S): 160; 4.5 AEROSOL RESPIRATORY (INHALATION) at 08:12

## 2019-04-03 RX ADMIN — Medication 2 MILLIGRAM(S): at 06:04

## 2019-04-03 RX ADMIN — PANTOPRAZOLE SODIUM 40 MILLIGRAM(S): 20 TABLET, DELAYED RELEASE ORAL at 06:05

## 2019-04-03 NOTE — PROGRESS NOTE ADULT - PROVIDER SPECIALTY LIST ADULT
Infectious Disease
Internal Medicine
Pulmonology
Internal Medicine
Internal Medicine

## 2019-04-03 NOTE — CHART NOTE - NSCHARTNOTEFT_GEN_A_CORE
<<<RESIDENT DISCHARGE NOTE>>>     ANASTACIO SIMENTAL  MRN-[id number]    VITAL SIGNS:  T(F): 99 (04-03-19 @ 08:30), Max: 99 (04-03-19 @ 08:30)  HR: 95 (04-03-19 @ 08:30)  BP: 152/73 (04-03-19 @ 08:30)  SpO2: 94% (04-03-19 @ 08:00)      PHYSICAL EXAMINATION:  General: NAD, good spirits, agitating to go before 9am, walking around unit, asking for IV out, insists she's back to baseline  Pulmonary: wheezing (likely baseline) but improved for admit, no crackles, good air entry  Cardiovascular: S1S2 regular rate and rhythm   Gastrointestinal/Abdomen & Pelvis: soft, non-tender, still distended, +Bowel sounds  Neurologic/Motor: No peripheral edema     ASSESSMENT: patient likely had viral exacerbation of COPD with possible underlying component of diastolic HF as pt improved w/ lasix. Patient aware of prednisone taper and lasix, and will follow up with Dr. Iyer/Dr. Fulton upon discharge.     TEST RESULTS:      04-02    136  |  98  |  34<H>  ----------------------------<  113<H>  4.8   |  23  |  0.9    Ca    10.2<H>      02 Apr 2019 07:47  Mg     2.2     04-02    TPro  7.8  /  Alb  4.4  /  TBili  0.3  /  DBili  x   /  AST  17  /  ALT  24  /  AlkPhos  185<H>  04-02      FINAL DISCHARGE INTERVIEW:  Resident(s) Present: (Name: Florentino RN Present: (Name:  Sarah)    DISCHARGE MEDICATION RECONCILIATION  reviewed with Attending (Name:Shireen)    DISPOSITION:   [ x ] Home,    [  ] Home with Visiting Nursing Services,   [    ]  SNF/ NH,    [   ] Acute Rehab (4A),   [   ] Other (Specify:_________)

## 2019-04-03 NOTE — PROGRESS NOTE ADULT - SUBJECTIVE AND OBJECTIVE BOX
ANASTACIO SIMENTAL  58y  Female      Patient is a 58y old  Female who presents with a chief complaint of SOB (02 Apr 2019 14:29)      INTERVAL HPI/OVERNIGHT EVENTS: Clearef by Pulmonary, discharge today. Feeling better      REVIEW OF SYSTEMS:  CONSTITUTIONAL: No fever, weight loss, or fatigue  EYES: No eye pain, visual disturbances, or discharge  ENMT:  No difficulty hearing, tinnitus, vertigo; No sinus or throat pain  NECK: No pain or stiffness  BREASTS: No pain, masses, or nipple discharge  RESPIRATORY: No cough, wheezing improved, no chills or hemoptysis; shortness of breath on exertion  CARDIOVASCULAR: No chest pain, palpitations, dizziness, or leg swelling  GASTROINTESTINAL: No abdominal or epigastric pain. No nausea, vomiting, or hematemesis; No diarrhea or constipation. No melena or hematochezia.  GENITOURINARY: No dysuria, frequency, hematuria, or incontinence  NEUROLOGICAL: No headaches, memory loss, loss of strength, numbness, or tremors  SKIN: No itching, burning, rashes, or lesions   LYMPH NODES: No enlarged glands  ENDOCRINE: No heat or cold intolerance; No hair loss  MUSCULOSKELETAL: No joint pain or swelling; No muscle, back, or extremity pain  PSYCHIATRIC: No depression, anxiety, mood swings, or difficulty sleeping  HEME/LYMPH: No easy bruising, or bleeding gums  ALLERY AND IMMUNOLOGIC: No hives or eczema    T(C): 36.2 (04-02-19 @ 14:52), Max: 36.2 (04-02-19 @ 14:52)  HR: 96 (04-02-19 @ 14:52) (96 - 96)  BP: 123/71 (04-02-19 @ 14:52) (123/71 - 123/71)  RR: 20 (04-02-19 @ 14:52) (20 - 20)  SpO2: 94% (04-02-19 @ 20:55) (94% - 94%)  Wt(kg): --Vital Signs Last 24 Hrs  T(C): 36.2 (02 Apr 2019 14:52), Max: 36.2 (02 Apr 2019 14:52)  T(F): 97.1 (02 Apr 2019 14:52), Max: 97.1 (02 Apr 2019 14:52)  HR: 96 (02 Apr 2019 14:52) (96 - 96)  BP: 123/71 (02 Apr 2019 14:52) (123/71 - 123/71)  BP(mean): --  RR: 20 (02 Apr 2019 14:52) (20 - 20)  SpO2: 94% (02 Apr 2019 20:55) (94% - 94%)    PHYSICAL EXAM:  GENERAL: NAD, well-groomed, well-developed  HEAD:  Atraumatic, Normocephalic  EYES: EOMI, PERRLA, conjunctiva and sclera clear  ENMT: No tonsillar erythema, exudates, or enlargement; Moist mucous membranes, Good dentition, No lesions  NECK: Supple, No JVD, Normal thyroid  NERVOUS SYSTEM:  Alert & Oriented X3, Good concentration; Motor Strength 5/5 B/L upper and lower extremities; DTRs 2+ intact and symmetric  CHEST/LUNG: Wheezing improved, crackles (baseline)  HEART: Regular rate and rhythm; No murmurs, rubs, or gallops  ABDOMEN: Soft, Nontender, Nondistended; Bowel sounds present  EXTREMITIES:  2+ Peripheral Pulses, No clubbing, cyanosis, or edema  LYMPH: No lymphadenopathy noted  SKIN: No rashes or lesions    Consultant(s) Notes Reviewed:  [x ] YES  [ ] NO    Discussed with Consultants/Other Providers [ x] YES     LABS                         13.3   8.17  )-----------( 185      ( 01 Apr 2019 08:35 )             40.1   04-02    136  |  98  |  34<H>  ----------------------------<  113<H>  4.8   |  23  |  0.9    Ca    10.2<H>      02 Apr 2019 07:47  Mg     2.2     04-02    TPro  7.8  /  Alb  4.4  /  TBili  0.3  /  DBili  x   /  AST  17  /  ALT  24  /  AlkPhos  185<H>  04-02        RADIOLOGY & ADDITIONAL TESTS:    Imaging Personally Reviewed:  [ ] YES  [ ] NO    MEDICATIONS  (STANDING):  ALBUTerol/ipratropium for Nebulization 3 milliLiter(s) Nebulizer every 6 hours  ALPRAZolam 1 milliGRAM(s) Oral at bedtime  buDESOnide 160 MICROgram(s)/formoterol 4.5 MICROgram(s) Inhaler 2 Puff(s) Inhalation two times a day  chlorhexidine 4% Liquid 1 Application(s) Topical <User Schedule>  clonazePAM Tablet 2 milliGRAM(s) Oral every 8 hours  escitalopram 10 milliGRAM(s) Oral daily  furosemide    Tablet 20 milliGRAM(s) Oral daily  gabapentin 800 milliGRAM(s) Oral three times a day  heparin  Injectable 5000 Unit(s) SubCutaneous every 8 hours  paliperidone ER. 1.5 milliGRAM(s) Oral daily  pantoprazole    Tablet 40 milliGRAM(s) Oral before breakfast    MEDICATIONS  (PRN):  acetaminophen   Tablet .. 650 milliGRAM(s) Oral every 6 hours PRN Mild Pain (1 - 3)  guaiFENesin/dextromethorphan  Syrup 5 milliLiter(s) Oral every 8 hours PRN Cough  methocarbamol 750 milliGRAM(s) Oral every 8 hours PRN muscle pain  simethicone 80 milliGRAM(s) Chew every 8 hours PRN Dyspepsia  traMADol 50 milliGRAM(s) Oral every 8 hours PRN Severe Pain (7 - 10)  HEALTH ISSUES - PROBLEM Dx:

## 2019-04-03 NOTE — PROGRESS NOTE ADULT - ASSESSMENT
1. COPD exacerbation: Stable  for D/C home today on tapering PO prednisone/bronchodilators per Pulmonary. No further need for antibiotics. STOP SMOKING. Follow up in office in 1 week [phone]. Outpatient Pulmonary follow up with Dr. Fulton. Remainder of home meds as ordered

## 2019-04-04 LAB
HCV RNA SERPL NAA DL=5-ACNC: SIGNIFICANT CHANGE UP IU/ML
HCV RNA SPEC NAA+PROBE-LOG IU: 6.24 LOGIU/ML — SIGNIFICANT CHANGE UP

## 2019-04-05 DIAGNOSIS — M54.16 RADICULOPATHY, LUMBAR REGION: ICD-10-CM

## 2019-04-05 DIAGNOSIS — G62.9 POLYNEUROPATHY, UNSPECIFIED: ICD-10-CM

## 2019-04-05 DIAGNOSIS — K83.8 OTHER SPECIFIED DISEASES OF BILIARY TRACT: ICD-10-CM

## 2019-04-05 DIAGNOSIS — I73.9 PERIPHERAL VASCULAR DISEASE, UNSPECIFIED: ICD-10-CM

## 2019-04-05 DIAGNOSIS — Z88.0 ALLERGY STATUS TO PENICILLIN: ICD-10-CM

## 2019-04-05 DIAGNOSIS — J12.9 VIRAL PNEUMONIA, UNSPECIFIED: ICD-10-CM

## 2019-04-05 DIAGNOSIS — J44.1 CHRONIC OBSTRUCTIVE PULMONARY DISEASE WITH (ACUTE) EXACERBATION: ICD-10-CM

## 2019-04-05 DIAGNOSIS — R07.81 PLEURODYNIA: ICD-10-CM

## 2019-04-05 DIAGNOSIS — F41.1 GENERALIZED ANXIETY DISORDER: ICD-10-CM

## 2019-04-05 DIAGNOSIS — G89.29 OTHER CHRONIC PAIN: ICD-10-CM

## 2019-04-05 DIAGNOSIS — Z95.828 PRESENCE OF OTHER VASCULAR IMPLANTS AND GRAFTS: ICD-10-CM

## 2019-04-05 DIAGNOSIS — F17.210 NICOTINE DEPENDENCE, CIGARETTES, UNCOMPLICATED: ICD-10-CM

## 2019-04-05 DIAGNOSIS — R14.0 ABDOMINAL DISTENSION (GASEOUS): ICD-10-CM

## 2019-04-05 DIAGNOSIS — J44.0 CHRONIC OBSTRUCTIVE PULMONARY DISEASE WITH (ACUTE) LOWER RESPIRATORY INFECTION: ICD-10-CM

## 2019-04-05 DIAGNOSIS — E83.42 HYPOMAGNESEMIA: ICD-10-CM

## 2019-04-05 DIAGNOSIS — J96.01 ACUTE RESPIRATORY FAILURE WITH HYPOXIA: ICD-10-CM

## 2019-04-05 DIAGNOSIS — I50.30 UNSPECIFIED DIASTOLIC (CONGESTIVE) HEART FAILURE: ICD-10-CM

## 2019-04-05 DIAGNOSIS — J18.9 PNEUMONIA, UNSPECIFIED ORGANISM: ICD-10-CM

## 2019-04-05 DIAGNOSIS — F31.9 BIPOLAR DISORDER, UNSPECIFIED: ICD-10-CM

## 2019-04-05 DIAGNOSIS — Z86.19 PERSONAL HISTORY OF OTHER INFECTIOUS AND PARASITIC DISEASES: ICD-10-CM

## 2019-08-05 PROBLEM — R56.9 SEIZURES: Status: ACTIVE | Noted: 2017-01-09

## 2021-07-26 NOTE — PROGRESS NOTE ADULT - SUBJECTIVE AND OBJECTIVE BOX
Pt here c/o redness and swelling to nose for 3 days. Concerned of an infection. She has not taken OTC for this.     Visit Vitals  /78   Pulse 68   Temp 98.7 °F (37.1 °C)   SpO2 98%        ANASTACIO SIMENTAL 58y Female  MRN#: [id number]   CODE STATUS:___DNR/DNI_____      SUBJECTIVE  Patient is a 58y old Female who presents with a chief complaint of SOB (01 Apr 2019 06:32)  Currently admitted to medicine with the primary diagnosis of Chronic obstructive pulmonary disease with acute exacerbation  Today is hospital day 2d, and this morning she is still having cough and reports feeling SOB overnight but pulse ox >90% on RA. Patient leaves unit regularly for coffee and cigarettes still, and says she'll need to be here all week because hospital discharged her earlier in the month "without proper treatment of antibiotics".       OBJECTIVE  PAST MEDICAL & SURGICAL HISTORY  PVD (peripheral vascular disease)  Asthma  Bipolar affective  Hepatitis C  COPD (chronic obstructive pulmonary disease)  No significant past surgical history    ALLERGIES:  ampicillin (Unknown)  Lithium Carbonate (Unknown)    MEDICATIONS:  STANDING MEDICATIONS  ALBUTerol/ipratropium for Nebulization 3 milliLiter(s) Nebulizer every 6 hours  ALPRAZolam 1 milliGRAM(s) Oral at bedtime  buDESOnide 160 MICROgram(s)/formoterol 4.5 MICROgram(s) Inhaler 2 Puff(s) Inhalation two times a day  cefepime   IVPB      cefepime   IVPB 2000 milliGRAM(s) IV Intermittent every 8 hours  chlorhexidine 4% Liquid 1 Application(s) Topical <User Schedule>  clonazePAM Tablet 2 milliGRAM(s) Oral two times a day  gabapentin 800 milliGRAM(s) Oral three times a day  heparin  Injectable 5000 Unit(s) SubCutaneous every 8 hours  vancomycin  IVPB 1000 milliGRAM(s) IV Intermittent every 12 hours    PRN MEDICATIONS  acetaminophen   Tablet .. 650 milliGRAM(s) Oral every 6 hours PRN  guaiFENesin    Syrup 100 milliGRAM(s) Oral every 6 hours PRN  simethicone 80 milliGRAM(s) Chew every 8 hours PRN      VITAL SIGNS: Last 24 Hours  T(C): 35.9 (01 Apr 2019 05:19), Max: 36.9 (31 Mar 2019 12:27)  T(F): 96.6 (01 Apr 2019 05:19), Max: 98.5 (31 Mar 2019 12:27)  HR: 77 (01 Apr 2019 05:19) (77 - 99)  BP: 100/59 (01 Apr 2019 05:19) (100/59 - 147/89)  BP(mean): --  RR: 16 (01 Apr 2019 05:19) (16 - 18)  SpO2: 94% (31 Mar 2019 19:53) (94% - 94%)    LABS:                        15.9   7.21  )-----------( 206      ( 30 Mar 2019 12:30 )             46.6     03-30    136  |  101  |  19  ----------------------------<  100<H>  5.2<H>   |  21  |  0.9    Ca    9.3      30 Mar 2019 12:30  Mg     1.7     03-30    TPro  7.1  /  Alb  3.7  /  TBili  0.7  /  DBili  x   /  AST  38  /  ALT  32  /  AlkPhos  228<H>  03-30                  RADIOLOGY:      PHYSICAL EXAM:    GENERAL: NAD, well-developed lady off O2, no resp distress, in good spirits, AAOx3  HEENT:  Atraumatic, Normocephalic. EOMI, PERRLA, anicteric, No JVD  PULMONARY: wheezing on expiration, no areas of dullness, dry cough  CARDIOVASCULAR: Regular rate and rhythm; No murmurs  GASTROINTESTINAL: Soft, Nontender, Nondistended; Bowel sounds present  EXT:  2+ Peripheral Pulses, trace edema of ankles  NEUROLOGY: non-focal  SKIN: No rashes       ADMISSION SUMMARY  Patient is a 58y old Female who presents with a chief complaint of SOB (30 Mar 2019 18:33)  Currently admitted to medicine with the primary diagnosis of Chronic obstructive pulmonary disease with acute exacerbation  58 Year Old Female PMH of COPD not on home oxygen, PVD s/p stent, Hep C, Bipolar Disorder, current smoker (3cig/day) presents with shortness of breath and cough. Pt states that she was admitted to the hospital on 3/6 and d/c'ed home on 3/9 for COPD exacerbation and pneumonia (noted with RML infiltrate on initial cxr). Pt was treated with prednisone/solumedrol and abx as inpatient and was d/c'ed home with prednisone. She completed course of prednisone but continues to have productive cough, wheezing, dyspnea with exertion and intermittent fever. Pt stated that she had a fever ptp [id number], and was using Symbicort albuterol inhaler (4-5x per day) and nebulizer (2x per day), with no improvement of sx. Pt was not d/c'ed home with abx and has not followed with pulmonology since d/c. She denies any ha, neck pain, chest pain, abdominal pain, n/v/d, leg swelling. No recent travel or sick contacts. Pt did not get flu shot this year but did get pneumonia vaccine. Pt is still smoking 1/2 ppd. She has been intubated in the past, last time was 6 years ago.       ASSESSMENT & PLAN    #SOB likely 2/2 Pneumonia with underlying COPD Exacerbation   - as per pt, breathing status has steadily worsened over past few years, w/ increased QUARLES, but SaO2 ambulating 93%  - f/u Pulmonary (Dr. Fulton)  - c/w Symbicort and Albuterol   - c/w Cefepime and Vanc for now  - De-escalate antibiotics once patient stable   - c/w Solu-Medrol 60 q8h  - guaifenasin for cough  - f/u BNP, consider echo if elevated     #Neuropathic Pain   - c/w Gabapentin 80 three times daily.     #Bipolar Disorder   - requested pt to bring in her meds, nonformulary, said she'd bring tmw    #MISC:   - DVT Prophylaxis: Heparin Sub q   - GI ppx: PPI  - from home  - DNR/DNI    (x) Discussion with patient and/or family regarding goals of care  (x) Discussed Case and Plan with Medical Attending, Name: Dale / Shireen note reviewed ANASTACIO SIMENTAL 58y Female  MRN#: [id number]   CODE STATUS:___DNR/DNI_____      SUBJECTIVE  Patient is a 58y old Female who presents with a chief complaint of SOB (01 Apr 2019 06:32)  Currently admitted to medicine with the primary diagnosis of Chronic obstructive pulmonary disease with acute exacerbation  Today is hospital day 2d, and this morning she is still having cough and reports feeling SOB overnight but pulse ox >90% on RA. Patient leaves unit regularly for coffee and cigarettes still, and says she'll need to be here all week because hospital discharged her earlier in the month "without proper treatment of antibiotics". Says pain in L ribs still quite severe.     Abd noted to be distended, follows w/ hepatologist in [place]. States hep C in remission.       OBJECTIVE  PAST MEDICAL & SURGICAL HISTORY  PVD (peripheral vascular disease)  Asthma  Bipolar affective  Hepatitis C  COPD (chronic obstructive pulmonary disease)  No significant past surgical history    ALLERGIES:  ampicillin (Unknown)  Lithium Carbonate (Unknown)    MEDICATIONS:  STANDING MEDICATIONS  ALBUTerol/ipratropium for Nebulization 3 milliLiter(s) Nebulizer every 6 hours  ALPRAZolam 1 milliGRAM(s) Oral at bedtime  buDESOnide 160 MICROgram(s)/formoterol 4.5 MICROgram(s) Inhaler 2 Puff(s) Inhalation two times a day  cefepime   IVPB      cefepime   IVPB 2000 milliGRAM(s) IV Intermittent every 8 hours  chlorhexidine 4% Liquid 1 Application(s) Topical <User Schedule>  clonazePAM Tablet 2 milliGRAM(s) Oral two times a day  gabapentin 800 milliGRAM(s) Oral three times a day  heparin  Injectable 5000 Unit(s) SubCutaneous every 8 hours  vancomycin  IVPB 1000 milliGRAM(s) IV Intermittent every 12 hours    PRN MEDICATIONS  acetaminophen   Tablet .. 650 milliGRAM(s) Oral every 6 hours PRN  guaiFENesin    Syrup 100 milliGRAM(s) Oral every 6 hours PRN  simethicone 80 milliGRAM(s) Chew every 8 hours PRN      VITAL SIGNS: Last 24 Hours  T(C): 35.9 (01 Apr 2019 05:19), Max: 36.9 (31 Mar 2019 12:27)  T(F): 96.6 (01 Apr 2019 05:19), Max: 98.5 (31 Mar 2019 12:27)  HR: 77 (01 Apr 2019 05:19) (77 - 99)  BP: 100/59 (01 Apr 2019 05:19) (100/59 - 147/89)  BP(mean): --  RR: 16 (01 Apr 2019 05:19) (16 - 18)  SpO2: 94% (31 Mar 2019 19:53) (94% - 94%)    LABS:                        15.9   7.21  )-----------( 206      ( 30 Mar 2019 12:30 )             46.6     03-30    136  |  101  |  19  ----------------------------<  100<H>  5.2<H>   |  21  |  0.9    Ca    9.3      30 Mar 2019 12:30  Mg     1.7     03-30    TPro  7.1  /  Alb  3.7  /  TBili  0.7  /  DBili  x   /  AST  38  /  ALT  32  /  AlkPhos  228<H>  03-30        RADIOLOGY:      PHYSICAL EXAM:    GENERAL: NAD, well-developed lady off O2, no resp distress, in good spirits, AAOx3  HEENT:  Atraumatic, Normocephalic. EOMI, PERRLA, anicteric  PULMONARY: wheezing on expiration, no areas of dullness, dry cough; point tenderness over lower anterior L ribs 7-10  CARDIOVASCULAR: Regular rate and rhythm; No murmurs  GASTROINTESTINAL: Soft, Nontender, mildly distended, mildly tense, some fluid wave; Bowel sounds present, tympanic  EXT:  2+ Peripheral Pulses, trace edema of ankles  SKIN: No rashes       ADMISSION SUMMARY  Patient is a 58y old Female who presents with a chief complaint of SOB (30 Mar 2019 18:33)  Currently admitted to medicine with the primary diagnosis of Chronic obstructive pulmonary disease with acute exacerbation  58 Year Old Female PMH of COPD not on home oxygen, PVD s/p stent, Hep C, Bipolar Disorder, current smoker (3cig/day) presents with shortness of breath and cough. Pt states that she was admitted to the hospital on 3/6 and d/c'ed home on 3/9 for COPD exacerbation and pneumonia (noted with RML infiltrate on initial cxr). Pt was treated with prednisone/solumedrol and abx as inpatient and was d/c'ed home with prednisone. She completed course of prednisone but continues to have productive cough, wheezing, dyspnea with exertion and intermittent fever. Pt stated that she had a fever ptp [id number], and was using Symbicort albuterol inhaler (4-5x per day) and nebulizer (2x per day), with no improvement of sx. Pt was not d/c'ed home with abx and has not followed with pulmonology since d/c. She denies any ha, neck pain, chest pain, abdominal pain, n/v/d, leg swelling. No recent travel or sick contacts. Pt did not get flu shot this year but did get pneumonia vaccine. Pt is still smoking 1/2 ppd. She has been intubated in the past, last time was 6 years ago.       ASSESSMENT & PLAN    #SOB likely 2/2 Pneumonia with underlying COPD Exacerbation   - as per pt, breathing status has steadily worsened over past few years, w/ increased QUARLES, but SaO2 ambulating 93%  - c/w Symbicort and Albuterol   - c/w Cefepime, Vanc , Azithromycin  - f/u MRSA nares, urine streg/legionella   - ID consult appreciated  - awaiting Pulm recs  - c/w Solu-Medrol 60 q8h  - guaifenasin/dextromorphan for cough    #L Rib pain  - f/u rib series  #Hx of Hep C  - abd more distended, suspicion for ascites  - as per pt, chronic in remission for 10+yrs   - f/u US abd    #Neuropathic Pain   - c/w Gabapentin 80 three times daily.     #Bipolar Disorder   - requested pt to bring in her meds, nonformulary, said she'd bring (PATIENT HAS NOT BROUGHT YET)    #MISC:   - DVT Prophylaxis: Heparin Sub q   - GI ppx: PPI  - from home  - DNR/DNI    (x) Discussion with patient and/or family regarding goals of care  (x) Discussed Case and Plan with Medical Attending, Name: Dale Iyer note reviewed

## 2021-11-09 NOTE — H&P ADULT - NSHPATTENDINGPLANDISCUSS_GEN_ALL_CORE
Chief Complaints and History of Present Illnesses   Patient presents with     Glaucoma Follow-Up      Chief Complaint(s) and History of Present Illness(es)     Glaucoma Follow-Up     Laterality: both eyes    Associated symptoms: Negative for eye pain, redness and tearing    Pain scale: 0/10              Comments     New patient glaucoma evaluation each eye.  Patient says vision is good each eye.  History of : POAG, pseudophakia each eye   Eye meds: none  FRANCE Martin 11/9/2021 2:20 PM                   Patient

## 2022-05-23 NOTE — ED PROVIDER NOTE - MDM ORDERS SUBMITTED SELECTION
Addended by: MICKEY GARCIA on: 5/23/2022 11:19 AM     Modules accepted: Orders     Labs/EKG/Imaging Studies/Medications

## 2022-11-21 NOTE — ED ADULT NURSE NOTE - OBJECTIVE STATEMENT
pt c/o sob, cough, wheezing x 1 month s/p d/c on 3/6. pt reports being d/c with prednisone. c/o fever last night t max 101. + smoker, on home O2
[Annual] : an annual visit.

## 2025-05-30 NOTE — DISCHARGE NOTE NURSING/CASE MANAGEMENT/SOCIAL WORK - NSTOBACCONEVERSMOKERY/N_GEN_A
"POA with chills and perinephric stranding on CT imaging  UA \"clean\" but likely sampled from nonobstructed kidney   Continue ceftriaxone   " Yes